# Patient Record
Sex: FEMALE | HISPANIC OR LATINO | Employment: FULL TIME | URBAN - METROPOLITAN AREA
[De-identification: names, ages, dates, MRNs, and addresses within clinical notes are randomized per-mention and may not be internally consistent; named-entity substitution may affect disease eponyms.]

---

## 2017-03-01 ENCOUNTER — OFFICE VISIT (OUTPATIENT)
Dept: URGENT CARE | Facility: PHYSICIAN GROUP | Age: 57
End: 2017-03-01
Payer: COMMERCIAL

## 2017-03-01 VITALS
HEART RATE: 72 BPM | BODY MASS INDEX: 26.06 KG/M2 | TEMPERATURE: 97.3 F | SYSTOLIC BLOOD PRESSURE: 118 MMHG | HEIGHT: 61 IN | WEIGHT: 138 LBS | DIASTOLIC BLOOD PRESSURE: 90 MMHG | RESPIRATION RATE: 14 BRPM | OXYGEN SATURATION: 97 %

## 2017-03-01 DIAGNOSIS — L01.00 IMPETIGO: ICD-10-CM

## 2017-03-01 PROCEDURE — 99214 OFFICE O/P EST MOD 30 MIN: CPT | Performed by: NURSE PRACTITIONER

## 2017-03-01 ASSESSMENT — ENCOUNTER SYMPTOMS
SHORTNESS OF BREATH: 0
NAUSEA: 0
CHILLS: 0
FEVER: 0
HEADACHES: 0
VOMITING: 0

## 2017-03-01 NOTE — PATIENT INSTRUCTIONS
Impetigo, Adult  Impetigo is an infection of the skin. It commonly occurs in young children, but it can also occur in adults. The infection causes itchy blisters and sores that produce brownish-yellow fluid. As the fluid dries, it forms a thick, honey-colored crust. These skin changes usually occur on the face but can also affect other areas of the body. Impetigo usually goes away in 7-10 days with treatment.  CAUSES  Impetigo is caused by two types of bacteria. It may be caused by staphylococci or streptococci bacteria. These bacteria cause impetigo when they get under the surface of the skin. This often happens after some damage to the skin, such as damage from:  · Cuts, scrapes, or scratches.  · Insect bites, especially when you scratch the area of a bite.  · Chickenpox or other illnesses that cause open skin sores.  · Nail biting or chewing.  Impetigo is contagious and can spread easily from one person to another. This may occur through close skin contact or by sharing towels, clothing, or other items with a person who has the infection.  RISK FACTORS  Some things that can increase the risk of getting this infection include:  · Playing sports that include skin-to-skin contact with others.  · Having a skin condition with open sores.  · Having many skin cuts or scrapes.  · Living in an area that has high humidity levels.  · Having poor hygiene.  · Having high levels of staphylococci in your nose.  SIGNS AND SYMPTOMS  Impetigo usually starts out as small blisters, often on the face. The blisters then break open and turn into tiny sores (lesions) with a yellow crust. In some cases, the blisters cause itching or burning. With scratching, irritation, or lack of treatment, these small lesions may get larger. Scratching can also cause impetigo to spread to other parts of the body. The bacteria can get under the fingernails and spread when you touch another area of your skin.  Other possible symptoms include:  · Larger  blisters.  · Pus.  · Swollen lymph glands.  DIAGNOSIS  This condition is usually diagnosed during a physical exam. A skin sample or sample of fluid from a blister may be taken for lab tests that involve growing bacteria (culture test). This can help confirm the diagnosis or help determine the best treatment.  TREATMENT  Mild impetigo can be treated with prescription antibiotic cream. Oral antibiotic medicine may be used in more severe cases. Medicines for itching may also be used.  HOME CARE INSTRUCTIONS  · Take medicines only as directed by your health care provider.  · To help prevent impetigo from spreading to other body areas:  ¨ Keep your fingernails short and clean.  ¨ Do not scratch the blisters or sores.  ¨ Cover infected areas, if necessary, to keep from scratching.  · Gently wash the infected areas with antibiotic soap and water.  · Soak crusted areas in warm, soapy water using antibiotic soap.  ¨ Gently rub the areas to remove crusts. Do not scrub.  · Wash your hands often to avoid spreading this infection.  · Stay home until you have used an antibiotic cream for 48 hours (2 days) or an oral antibiotic medicine for 24 hours (1 day). You should only return to work and activities with other people if your skin shows significant improvement.  PREVENTION   To keep the infection from spreading:  · Stay home until you have used an antibiotic cream for 48 hours or an oral antibiotic for 24 hours.  · Wash your hands often.  · Do not engage in skin-to-skin contact with other people while you have still have blisters.  · Do not share towels, washcloths, or bedding with others while you have the infection.  SEEK MEDICAL CARE IF:  · You develop more blisters or sores despite treatment.  · Other family members get sores.  · Your skin sores are not improving after 48 hours of treatment.  · You have a fever.  SEEK IMMEDIATE MEDICAL CARE IF:  · You see spreading redness or swelling of the skin around your sores.  · You  see red streaks coming from your sores.  · You develop a sore throat.     This information is not intended to replace advice given to you by your health care provider. Make sure you discuss any questions you have with your health care provider.     Document Released: 01/08/2016 Document Reviewed: 01/08/2016  [employer] Interactive Patient Education ©2016 [employer]

## 2017-03-01 NOTE — PROGRESS NOTES
"Subjective:      Cherrie Gordon is a 56 y.o. female who presents with Blister            HPI Comments: Medications, Allergies and Prior Medical Hx reviewed and updated in [facility].with patient/family today         Blister  This is a new problem. The current episode started yesterday. The problem occurs constantly. The problem has been unchanged. Associated symptoms include a rash. Pertinent negatives include no chills, fever, headaches, nausea or vomiting. Nothing aggravates the symptoms. Treatments tried: neosporine. The treatment provided mild relief.       Review of Systems   Constitutional: Negative for fever and chills.   Respiratory: Negative for shortness of breath.    Gastrointestinal: Negative for nausea and vomiting.   Skin: Positive for itching and rash.   Neurological: Negative for headaches.          Objective:     /90 mmHg  Pulse 72  Temp(Src) 36.3 °C (97.3 °F)  Resp 14  Ht 1.549 m (5' 0.98\")  Wt 62.596 kg (138 lb)  BMI 26.09 kg/m2  SpO2 97%     Physical Exam   Constitutional: She appears well-developed and well-nourished. No distress.   HENT:   Head: Normocephalic and atraumatic.   Eyes: Conjunctivae are normal. Pupils are equal, round, and reactive to light.   Neck: Neck supple.   Cardiovascular: Normal rate, regular rhythm and normal heart sounds.    Pulmonary/Chest: Effort normal and breath sounds normal. No respiratory distress.   Neurological: She is alert.   Awake, alert, answering questions appropriately, moving all extremeties   Skin: Skin is warm and dry. No rash noted.        Psychiatric: She has a normal mood and affect. Her behavior is normal.   Vitals reviewed.              Assessment/Plan:       1. Impetigo  mupirocin (BACTROBAN) 2 % Ointment           Pt will go to the ER for worsening or changing symptoms as discussed, worsening or changing rash, fevers, swelling mouth or throat, difficulty breathing  Follow-up with your primary care provider or return here if " not improving in 1 week  Discharge instructions discussed with pt/family who verbalize understanding and agreement with poc

## 2017-03-01 NOTE — MR AVS SNAPSHOT
"        Cherrie Jonesnarciso   3/1/2017 8:30 AM   Office Visit   MRN: [id number]    Department:  [facility]   Dept Phone:  [phone]    Description:  Female : 1960   Provider:  BART Soriano           Reason for Visit     Blister to lower lip, onset last night, with pain under her chin and around her nose      Allergies as of 3/1/2017     Allergen Noted Reactions    Pcn [Penicillins] 2010         You were diagnosed with     Impetigo   [684.ICD-9-CM]         Vital Signs     Blood Pressure Pulse Temperature Respirations Height Weight    118/90 mmHg 72 36.3 °C (97.3 °F) 14 1.549 m (5' 0.98\") 62.596 kg (138 lb)    Body Mass Index Oxygen Saturation Smoking Status             26.09 kg/m2 97% Former Smoker         Basic Information     Date Of Birth Sex Race Ethnicity Preferred Language    1960 Female  or  Non- English      Problem List              ICD-10-CM Priority Class Noted - Resolved    Insomnia G47.00   2016 - Present    Osteoporosis M81.0   2016 - Present    Gastroesophageal reflux disease without esophagitis K21.9   2016 - Present      Health Maintenance        Date Due Completion Dates    IMM DTaP/Tdap/Td Vaccine (1 - Tdap) 3/28/1981 ---    PAP SMEAR 3/28/1983 ---    COLONOSCOPY 3/28/2012 ---    IMM INFLUENZA (1) 2016    MAMMOGRAM 5/10/2017 5/10/2016, 12/3/2010, 2004            Current Immunizations     Influenza Vaccine Quad Inj (Pf) 2016      Below and/or attached are the medications your provider expects you to take. Review all of your home medications and newly ordered medications with your provider and/or pharmacist. Follow medication instructions as directed by your provider and/or pharmacist. Please keep your medication list with you and share with your provider. Update the information when medications are discontinued, doses are changed, or new medications (including over-the-counter products) " are added; and carry medication information at all times in the event of emergency situations     Allergies:  PCN - (reactions not documented)               Medications  Valid as of: March 01, 2017 -  9:15 AM    Generic Name Brand Name Tablet Size Instructions for use    Azithromycin (Tab) ZITHROMAX 250 MG Take as directed on package. Dispense one package.        Azithromycin (Tab) ZITHROMAX 250 MG With food as directed        DiphenhydrAMINE HCl   Take  by mouth.        Fluticasone Propionate (Suspension) FLONASE 50 MCG/ACT Spray 2 Sprays in nose every day.        Ibuprofen (Tab) MOTRIN 200 MG Take 200 mg by mouth every 6 hours as needed.        Mupirocin (Ointment) BACTROBAN 2 % Apply a thin layer to wound or rash 3 times a day        Promethazine-Codeine (Syrup) PHENERGAN-CODEINE 6.25-10 MG/5ML Take 3-4 mL by mouth every 12 hours as needed.        RaNITidine HCl (Tab) ZANTAC 150 MG Take 1 Tab by mouth 2 times a day.        .                 Medicines prescribed today were sent to:     [facility] [address] [place] [address] [place], [place] - [address] [facility]    [address] [place] [address]    Phone: [phone] Fax: [phone]    Open 24 Hours?: No      Medication refill instructions:       If your prescription bottle indicates you have medication refills left, it is not necessary to call your provider’s office. Please contact your pharmacy and they will refill your medication.    If your prescription bottle indicates you do not have any refills left, you may request refills at any time through one of the following ways: The online [employer] system (except Urgent Care), by calling your provider’s office, or by asking your pharmacy to contact your provider’s office with a refill request. Medication refills are processed only during regular business hours and may not be available until the next business day. Your provider may request additional information or to have a follow-up visit with you  prior to refilling your medication.   *Please Note: Medication refills are assigned a new Rx number when refilled electronically. Your pharmacy may indicate that no refills were authorized even though a new prescription for the same medication is available at the pharmacy. Please request the medicine by name with the pharmacy before contacting your provider for a refill.        Instructions    Impetigo, Adult  Impetigo is an infection of the skin. It commonly occurs in young children, but it can also occur in adults. The infection causes itchy blisters and sores that produce brownish-yellow fluid. As the fluid dries, it forms a thick, honey-colored crust. These skin changes usually occur on the face but can also affect other areas of the body. Impetigo usually goes away in 7-10 days with treatment.  CAUSES  Impetigo is caused by two types of bacteria. It may be caused by staphylococci or streptococci bacteria. These bacteria cause impetigo when they get under the surface of the skin. This often happens after some damage to the skin, such as damage from:  · Cuts, scrapes, or scratches.  · Insect bites, especially when you scratch the area of a bite.  · Chickenpox or other illnesses that cause open skin sores.  · Nail biting or chewing.  Impetigo is contagious and can spread easily from one person to another. This may occur through close skin contact or by sharing towels, clothing, or other items with a person who has the infection.  RISK FACTORS  Some things that can increase the risk of getting this infection include:  · Playing sports that include skin-to-skin contact with others.  · Having a skin condition with open sores.  · Having many skin cuts or scrapes.  · Living in an area that has high humidity levels.  · Having poor hygiene.  · Having high levels of staphylococci in your nose.  SIGNS AND SYMPTOMS  Impetigo usually starts out as small blisters, often on the face. The blisters then break open and turn into  tiny sores (lesions) with a yellow crust. In some cases, the blisters cause itching or burning. With scratching, irritation, or lack of treatment, these small lesions may get larger. Scratching can also cause impetigo to spread to other parts of the body. The bacteria can get under the fingernails and spread when you touch another area of your skin.  Other possible symptoms include:  · Larger blisters.  · Pus.  · Swollen lymph glands.  DIAGNOSIS  This condition is usually diagnosed during a physical exam. A skin sample or sample of fluid from a blister may be taken for lab tests that involve growing bacteria (culture test). This can help confirm the diagnosis or help determine the best treatment.  TREATMENT  Mild impetigo can be treated with prescription antibiotic cream. Oral antibiotic medicine may be used in more severe cases. Medicines for itching may also be used.  HOME CARE INSTRUCTIONS  · Take medicines only as directed by your health care provider.  · To help prevent impetigo from spreading to other body areas:  ¨ Keep your fingernails short and clean.  ¨ Do not scratch the blisters or sores.  ¨ Cover infected areas, if necessary, to keep from scratching.  · Gently wash the infected areas with antibiotic soap and water.  · Soak crusted areas in warm, soapy water using antibiotic soap.  ¨ Gently rub the areas to remove crusts. Do not scrub.  · Wash your hands often to avoid spreading this infection.  · Stay home until you have used an antibiotic cream for 48 hours (2 days) or an oral antibiotic medicine for 24 hours (1 day). You should only return to work and activities with other people if your skin shows significant improvement.  PREVENTION   To keep the infection from spreading:  · Stay home until you have used an antibiotic cream for 48 hours or an oral antibiotic for 24 hours.  · Wash your hands often.  · Do not engage in skin-to-skin contact with other people while you have still have blisters.  · Do  not share towels, washcloths, or bedding with others while you have the infection.  SEEK MEDICAL CARE IF:  · You develop more blisters or sores despite treatment.  · Other family members get sores.  · Your skin sores are not improving after 48 hours of treatment.  · You have a fever.  SEEK IMMEDIATE MEDICAL CARE IF:  · You see spreading redness or swelling of the skin around your sores.  · You see red streaks coming from your sores.  · You develop a sore throat.     This information is not intended to replace advice given to you by your health care provider. Make sure you discuss any questions you have with your health care provider.     Document Released: 01/08/2016 Document Reviewed: 01/08/2016  [employer] Interactive Patient Education ©2016 [employer]            [employer] Access Code: Activation code not generated  Current [employer] Status: Active

## 2017-06-13 ENCOUNTER — HOSPITAL ENCOUNTER (OUTPATIENT)
Dept: RADIOLOGY | Facility: MEDICAL CENTER | Age: 57
End: 2017-06-13
Attending: NURSE PRACTITIONER
Payer: COMMERCIAL

## 2017-06-13 DIAGNOSIS — Z12.31 VISIT FOR SCREENING MAMMOGRAM: ICD-10-CM

## 2017-06-13 DIAGNOSIS — Z30.09 SCREENING AND EVALUATION FOR FEMALE STERILIZATION: ICD-10-CM

## 2017-06-13 PROCEDURE — G0202 SCR MAMMO BI INCL CAD: HCPCS

## 2017-09-12 ENCOUNTER — OFFICE VISIT (OUTPATIENT)
Dept: MEDICAL GROUP | Facility: MEDICAL CENTER | Age: 57
End: 2017-09-12
Payer: COMMERCIAL

## 2017-09-12 ENCOUNTER — HOSPITAL ENCOUNTER (OUTPATIENT)
Dept: RADIOLOGY | Facility: MEDICAL CENTER | Age: 57
End: 2017-09-12
Attending: NURSE PRACTITIONER
Payer: COMMERCIAL

## 2017-09-12 VITALS
BODY MASS INDEX: 27.09 KG/M2 | DIASTOLIC BLOOD PRESSURE: 60 MMHG | SYSTOLIC BLOOD PRESSURE: 102 MMHG | HEART RATE: 81 BPM | HEIGHT: 60 IN | OXYGEN SATURATION: 92 % | RESPIRATION RATE: 16 BRPM | TEMPERATURE: 97.3 F | WEIGHT: 138 LBS

## 2017-09-12 DIAGNOSIS — Z12.11 SCREEN FOR COLON CANCER: ICD-10-CM

## 2017-09-12 DIAGNOSIS — M25.542 ARTHRALGIA OF BOTH HANDS: ICD-10-CM

## 2017-09-12 DIAGNOSIS — Z23 INFLUENZA VACCINE NEEDED: ICD-10-CM

## 2017-09-12 DIAGNOSIS — I80.8 PHLEBITIS AND THROMBOPHLEBITIS OF DEEP VEINS OF THE UPPER EXTREMITIES: ICD-10-CM

## 2017-09-12 DIAGNOSIS — M25.541 ARTHRALGIA OF BOTH HANDS: ICD-10-CM

## 2017-09-12 DIAGNOSIS — M81.0 AGE-RELATED OSTEOPOROSIS WITHOUT CURRENT PATHOLOGICAL FRACTURE: ICD-10-CM

## 2017-09-12 DIAGNOSIS — Z00.00 ROUTINE GENERAL MEDICAL EXAMINATION AT A HEALTH CARE FACILITY: ICD-10-CM

## 2017-09-12 PROCEDURE — 77077 JOINT SURVEY SINGLE VIEW: CPT

## 2017-09-12 PROCEDURE — 90686 IIV4 VACC NO PRSV 0.5 ML IM: CPT | Performed by: NURSE PRACTITIONER

## 2017-09-12 PROCEDURE — 99214 OFFICE O/P EST MOD 30 MIN: CPT | Mod: 25 | Performed by: NURSE PRACTITIONER

## 2017-09-12 PROCEDURE — 90471 IMMUNIZATION ADMIN: CPT | Performed by: NURSE PRACTITIONER

## 2017-09-12 RX ORDER — ALENDRONATE SODIUM 35 MG/1
35 TABLET ORAL
Qty: 4 TAB | Refills: 11 | Status: SHIPPED | OUTPATIENT
Start: 2017-09-12 | End: 2018-10-15 | Stop reason: SDUPTHER

## 2017-09-12 ASSESSMENT — PATIENT HEALTH QUESTIONNAIRE - PHQ9: CLINICAL INTERPRETATION OF PHQ2 SCORE: 0

## 2017-09-12 NOTE — PROGRESS NOTES
Subjective:      Cherrie Gordon is a 57 y.o. female who presents with Pain (joint pain in feet and hands x 3 months comes and goes)            HPI Cherrie Samuels is a pleasant [place]raini-speaking female here today accompanied by her daughter to discuss lab results as well as problems with joint pain and phlebitis. I have not seen patient in 1-1/2 years.      1. Age-related osteoporosis without current pathological fracture  Patient had a bone density scan done earlier this year which showed osteoporosis. She reports that at one time she was on Fosamax for a few years but then came off the medicine and has not been on it for at least 2 years. She has not had a recent fracture.    2. Arthralgia of both hands  Patient states she gets recurring pain in the thumbs of her hands bilaterally. It also sometimes affects her large toes bilaterally. It comes on most often when she is active such as with doing housework and walking. 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 She has not noticed redness or swelling of the joints.    3. Phlebitis and thrombophlebitis of deep veins of the upper extremities  Patient has a raised, purplish appearing area on her right forearm. She states this has been present for years but with her current job, it often rubs up against things causing discomfort. She would like to have her removed if possible.      Current Outpatient Prescriptions   Medication Sig Dispense Refill   • alendronate (FOSAMAX) 35 MG tablet Take 1 Tab by mouth every 7 days. 4 Tab 11   • DiphenhydrAMINE HCl (BENADRYL ALLERGY PO) Take  by mouth.     • mupirocin (BACTROBAN) 2 % Ointment Apply a thin layer to wound or rash 3 times a day (Patient not taking: Reported on 9/12/2017) 1 Tube 0   • azithromycin (ZITHROMAX) 250 MG Tab With food as directed (Patient not taking: Reported on 9/12/2017) 6 Tab 0   • ibuprofen (MOTRIN) 200 MG Tab Take 200 mg by mouth every 6  hours as needed.     • azithromycin (ZITHROMAX) 250 MG Tab Take as directed on package. Dispense one package. (Patient not taking: Reported on 9/12/2017) 6 Tab 0   • promethazine-codeine (PHENERGAN-CODEINE) 6.25-10 MG/5ML Syrup Take 3-4 mL by mouth every 12 hours as needed. 150 mL 0   • fluticasone (FLONASE) 50 MCG/ACT nasal spray Spray 2 Sprays in nose every day. 16 g 0   • ranitidine (ZANTAC) 150 MG Tab Take 1 Tab by mouth 2 times a day. 60 Tab 11     No current facility-administered medications for this visit.      Social History   Substance Use Topics   • Smoking status: Former Smoker     Packs/day: 0.25     Years: 2.00     Types: Cigarettes   • Smokeless tobacco: Never Used      Comment: started smoking at age 32 1 pack per month   • Alcohol use No     Past Medical History:   Diagnosis Date   • OSTEOPOROSIS    • Ulcer (CMS-HCC)      Family History   Problem Relation Age of Onset   • Heart Disease Mother    • Dementia Father        Review of Systems   Musculoskeletal: Positive for joint pain.   All other systems reviewed and are negative.         Objective:     /60   Pulse 81   Temp 36.3 °C (97.3 °F)   Resp 16   Ht 1.524 m (5')   Wt 62.6 kg (138 lb)   SpO2 92%   BMI 26.95 kg/m²      Physical Exam   Constitutional: She is oriented to person, place, and time. She appears well-developed and well-nourished. No distress.   HENT:   Head: Normocephalic and atraumatic.   Right Ear: External ear normal.   Left Ear: External ear normal.   Nose: Nose normal.   Eyes: Right eye exhibits no discharge. Left eye exhibits no discharge.   Neck: Normal range of motion. Neck supple. No thyromegaly present.   Cardiovascular: Normal rate, regular rhythm and normal heart sounds.  Exam reveals no gallop and no friction rub.    No murmur heard.  Pulmonary/Chest: Effort normal and breath sounds normal. She has no wheezes. She has no rales.   Musculoskeletal: She exhibits no edema or tenderness.   Inspection of the toes and  hands bilaterally reveal no edema or deformity. There is no erythema. She has good range of motion.   Neurological: She is alert and oriented to person, place, and time. She displays normal reflexes.   Skin: Skin is warm and dry. No rash noted. She is not diaphoretic.   There are 3 raised purple colored areas on the right forearm.   Psychiatric: She has a normal mood and affect. Her behavior is normal. Judgment and thought content normal.   Nursing note and vitals reviewed.              Assessment/Plan:     1. Age-related osteoporosis without current pathological fracture  Patient's bone density scan shows osteoporosis. I explained the risk for fractures and she will start back on Fosamax weekly. I told her we should do a repeat bone density scan in 1-2 years. She also needs to be sure she is using her calcium and vitamin D.  - alendronate (FOSAMAX) 35 MG tablet; Take 1 Tab by mouth every 7 days.  Dispense: 4 Tab; Refill: 11    2. Arthralgia of both hands  Possibly osteoarthritis but because it is multiple joints and will do a rheumatoid workup.  - DX-JOINT SURVEY-HANDS SINGLE VIEW; Future  - RHEUMATOID ARTHRITIS FACTOR; Future  - CCP ANTIBODY; Future  - URIC ACID; Future  - WESTERGREN SED RATE; Future  - SANAZ ANTIBODY WITH REFLEX    3. Phlebitis and thrombophlebitis of deep veins of the upper extremities  I will refer patient to a vascular specialist to see if there is anything that can be done for this raised vascular area on her right arm which she has had for a while but is causing discomfort.  - REFERRAL TO VASCULAR SURGERY    4. Screen for colon cancer    - REFERRAL TO GASTROENTEROLOGY    5. Influenza vaccine needed  I have placed the below orders and discussed them with an approved delegating provider. The MA is performing the below orders under the direction of Dr. Kay    - Flu Quad Inj >3 Year Pre-Filled PF    6. Routine general medical examination at a health care facility    - COMP METABOLIC PANEL;  Future  - LIPID PROFILE; Future

## 2017-09-13 ENCOUNTER — HOSPITAL ENCOUNTER (OUTPATIENT)
Dept: LAB | Facility: MEDICAL CENTER | Age: 57
End: 2017-09-13
Attending: NURSE PRACTITIONER
Payer: COMMERCIAL

## 2017-09-13 DIAGNOSIS — M25.542 ARTHRALGIA OF BOTH HANDS: ICD-10-CM

## 2017-09-13 DIAGNOSIS — M25.541 ARTHRALGIA OF BOTH HANDS: ICD-10-CM

## 2017-09-13 DIAGNOSIS — Z00.00 ROUTINE GENERAL MEDICAL EXAMINATION AT A HEALTH CARE FACILITY: ICD-10-CM

## 2017-09-13 LAB
ALBUMIN SERPL BCP-MCNC: 4.3 G/DL (ref 3.2–4.9)
ALBUMIN/GLOB SERPL: 1.3 G/DL
ALP SERPL-CCNC: 79 U/L (ref 30–99)
ALT SERPL-CCNC: 22 U/L (ref 2–50)
ANION GAP SERPL CALC-SCNC: 5 MMOL/L (ref 0–11.9)
AST SERPL-CCNC: 21 U/L (ref 12–45)
BILIRUB SERPL-MCNC: 0.4 MG/DL (ref 0.1–1.5)
BUN SERPL-MCNC: 18 MG/DL (ref 8–22)
CALCIUM SERPL-MCNC: 9.7 MG/DL (ref 8.5–10.5)
CHLORIDE SERPL-SCNC: 108 MMOL/L (ref 96–112)
CHOLEST SERPL-MCNC: 190 MG/DL (ref 100–199)
CO2 SERPL-SCNC: 25 MMOL/L (ref 20–33)
CREAT SERPL-MCNC: 0.71 MG/DL (ref 0.5–1.4)
ERYTHROCYTE [SEDIMENTATION RATE] IN BLOOD BY WESTERGREN METHOD: 10 MM/HOUR (ref 0–30)
GFR SERPL CREATININE-BSD FRML MDRD: >60 ML/MIN/1.73 M 2
GLOBULIN SER CALC-MCNC: 3.2 G/DL (ref 1.9–3.5)
GLUCOSE SERPL-MCNC: 92 MG/DL (ref 65–99)
HDLC SERPL-MCNC: 60 MG/DL
LDLC SERPL CALC-MCNC: 118 MG/DL
POTASSIUM SERPL-SCNC: 4.2 MMOL/L (ref 3.6–5.5)
PROT SERPL-MCNC: 7.5 G/DL (ref 6–8.2)
RHEUMATOID FACT SER IA-ACNC: <10 IU/ML (ref 0–14)
SODIUM SERPL-SCNC: 138 MMOL/L (ref 135–145)
TRIGL SERPL-MCNC: 62 MG/DL (ref 0–149)
URATE SERPL-MCNC: 4.2 MG/DL (ref 1.9–8.2)

## 2017-09-13 PROCEDURE — 86431 RHEUMATOID FACTOR QUANT: CPT

## 2017-09-13 PROCEDURE — 86200 CCP ANTIBODY: CPT

## 2017-09-13 PROCEDURE — 85652 RBC SED RATE AUTOMATED: CPT

## 2017-09-13 PROCEDURE — 36415 COLL VENOUS BLD VENIPUNCTURE: CPT

## 2017-09-13 PROCEDURE — 80061 LIPID PANEL: CPT

## 2017-09-13 PROCEDURE — 80053 COMPREHEN METABOLIC PANEL: CPT

## 2017-09-13 PROCEDURE — 84550 ASSAY OF BLOOD/URIC ACID: CPT

## 2017-09-14 LAB — CCP IGG SERPL-ACNC: 4 UNITS (ref 0–19)

## 2018-01-05 ENCOUNTER — OFFICE VISIT (OUTPATIENT)
Dept: MEDICAL GROUP | Facility: MEDICAL CENTER | Age: 58
End: 2018-01-05
Payer: COMMERCIAL

## 2018-01-05 VITALS
HEIGHT: 60 IN | BODY MASS INDEX: 27.48 KG/M2 | DIASTOLIC BLOOD PRESSURE: 70 MMHG | HEART RATE: 64 BPM | OXYGEN SATURATION: 97 % | RESPIRATION RATE: 16 BRPM | SYSTOLIC BLOOD PRESSURE: 122 MMHG | WEIGHT: 140 LBS

## 2018-01-05 DIAGNOSIS — G24.5 EYE TWITCH: ICD-10-CM

## 2018-01-05 DIAGNOSIS — K63.5 POLYP OF COLON, UNSPECIFIED PART OF COLON, UNSPECIFIED TYPE: ICD-10-CM

## 2018-01-05 DIAGNOSIS — K30 INDIGESTION: ICD-10-CM

## 2018-01-05 DIAGNOSIS — R51.9 NONINTRACTABLE EPISODIC HEADACHE, UNSPECIFIED HEADACHE TYPE: ICD-10-CM

## 2018-01-05 DIAGNOSIS — M81.0 AGE-RELATED OSTEOPOROSIS WITHOUT CURRENT PATHOLOGICAL FRACTURE: ICD-10-CM

## 2018-01-05 DIAGNOSIS — F51.01 PRIMARY INSOMNIA: ICD-10-CM

## 2018-01-05 DIAGNOSIS — K21.9 GASTROESOPHAGEAL REFLUX DISEASE WITHOUT ESOPHAGITIS: ICD-10-CM

## 2018-01-05 DIAGNOSIS — Z01.419 VISIT FOR GYNECOLOGIC EXAMINATION: ICD-10-CM

## 2018-01-05 PROCEDURE — 99213 OFFICE O/P EST LOW 20 MIN: CPT | Performed by: NURSE PRACTITIONER

## 2018-01-05 ASSESSMENT — ENCOUNTER SYMPTOMS
HEADACHES: 1
HEARTBURN: 1
INSOMNIA: 1

## 2018-01-05 NOTE — PROGRESS NOTES
Subjective:      Cherrie Gordon is a 57 y.o. female who presents with Annual Exam    CC: Is here today accompanied by her daughter for a number of concerns including indigestion eye twitch and headache.        HPI Cherrie Aguirres here today for the following.      1. Age-related osteoporosis without current pathological fracture  Patient was started on bisphosphonate within the year because of osteoporosis and reports no side effects. She is due for her two-year follow-up bone density scan in May.    2. Gastroesophageal reflux disease without esophagitis  Patient continues to use Zantac as needed for acid reflux with no worsening of symptoms.    3. Eye twitch  Patient is concerned about a twitching of the right eye which has been present for about 2 months. It occurs frequently throughout the day. She does not know what triggers it. She states sometimes she has discomfort on the right side of her head with this. She denies vision loss, tearing, sinus pain or pressure. She does wear glasses but has not been to an ophthalmologist recently.    4. Nonintractable episodic headache, unspecified headache type  Patient reports possible headache associated with the eye twitching but she has had no confusion, oral, nausea or unilateral weakness.    5. Primary insomnia  Patient continues to have some issues with insomnia but uses over-the-counter medicine for.    6. Indigestion  Patient reports her indigestion has been more frequent recently but she has had no black stools or vomiting and uses Zantac periodically.    7. Polyp of colon, unspecified part of colon, unspecified type  Patient was seen in the last year for her colonoscopy and was found to have a colon polyp and five-year follow-up was recommended.    8. Visit for gynecologic examination  Patient has not had a Pap smear in a few years and would like to see a female medical provider.  Social History   Substance Use Topics   • Smoking  status: Former Smoker     Packs/day: 0.25     Years: 2.00     Types: Cigarettes   • Smokeless tobacco: Never Used      Comment: started smoking at age 32 1 pack per month   • Alcohol use No     Current Outpatient Prescriptions   Medication Sig Dispense Refill   • alendronate (FOSAMAX) 35 MG tablet Take 1 Tab by mouth every 7 days. 4 Tab 11   • fluticasone (FLONASE) 50 MCG/ACT nasal spray Spray 2 Sprays in nose every day. 16 g 0   • ranitidine (ZANTAC) 150 MG Tab Take 1 Tab by mouth 2 times a day. 60 Tab 11     No current facility-administered medications for this visit.      Family History   Problem Relation Age of Onset   • Heart Disease Mother    • Dementia Father      Past Medical History:   Diagnosis Date   • OSTEOPOROSIS    • Ulcer (CMS-[facility])        Review of Systems   Gastrointestinal: Positive for heartburn.   Neurological: Positive for headaches.   Psychiatric/Behavioral: The patient has insomnia.    All other systems reviewed and are negative.         Objective:     /70   Pulse 64   Resp 16   Ht 1.524 m (5')   Wt 63.5 kg (140 lb)   SpO2 97%   BMI 27.34 kg/m²      Physical Exam   Constitutional: She is oriented to person, place, and time. She appears well-developed and well-nourished. No distress.   HENT:   Head: Normocephalic and atraumatic.   Right Ear: External ear normal.   Left Ear: External ear normal.   Nose: Nose normal.   Eyes: Right eye exhibits no discharge. Left eye exhibits no discharge.   Neck: Normal range of motion. Neck supple. No thyromegaly present.   Cardiovascular: Normal rate, regular rhythm and normal heart sounds.  Exam reveals no gallop and no friction rub.    No murmur heard.  Pulmonary/Chest: Effort normal and breath sounds normal. She has no wheezes. She has no rales.   Musculoskeletal: She exhibits no edema or tenderness.   Neurological: She is alert and oriented to person, place, and time. She displays normal reflexes.   Once during the exam today twitching of the  right upper eyelid was noted for a few seconds. No tenderness to palpation over the head. She has normal neurological activity.   Skin: Skin is warm and dry. No rash noted. She is not diaphoretic.   Psychiatric: She has a normal mood and affect. Her behavior is normal. Judgment and thought content normal.   Nursing note and vitals reviewed.              Assessment/Plan:     1. Age-related osteoporosis without current pathological fracture  Patient due for two-year follow-up bone density scan.  - DS-BONE DENSITY STUDY (DEXA); Future    2. Gastroesophageal reflux disease without esophagitis  Patient will continue with Zantac as needed.    3. Eye twitch  I advised patient this is most likely musculoskeletal and spasm which is not severe but she is concerned because of this with her headache and it not improving so a CT of the head was ordered. She has no tenderness over the right side of her head and her vision has not changed. I told her if it does not improve I would refer her to neurology.  - CT-HEAD W/O; Future    4. Nonintractable episodic headache, unspecified headache type  I will try to get imaging because of new onset headache and eye twitching.  - CT-HEAD W/O; Future    5. Primary insomnia  Patient will continue with her regular medicines.    6. Indigestion  Patient advised to use her Zantac.    7. Polyp of colon, unspecified part of colon, unspecified type  I reviewed with patient results from gastroenterology and need for follow-up colonoscopy in a few years.    8. Visit for gynecologic examination  Patient due for 3 year mammogram.  - REFERRAL TO GYNECOLOGY

## 2018-01-12 ENCOUNTER — HOSPITAL ENCOUNTER (OUTPATIENT)
Dept: RADIOLOGY | Facility: MEDICAL CENTER | Age: 58
End: 2018-01-12
Attending: NURSE PRACTITIONER
Payer: COMMERCIAL

## 2018-01-12 DIAGNOSIS — G24.5 EYE TWITCH: ICD-10-CM

## 2018-01-12 DIAGNOSIS — R51.9 NONINTRACTABLE EPISODIC HEADACHE, UNSPECIFIED HEADACHE TYPE: ICD-10-CM

## 2018-01-12 PROCEDURE — 70450 CT HEAD/BRAIN W/O DYE: CPT

## 2018-01-26 ENCOUNTER — HOSPITAL ENCOUNTER (OUTPATIENT)
Dept: RADIOLOGY | Facility: MEDICAL CENTER | Age: 58
End: 2018-01-26
Attending: NURSE PRACTITIONER
Payer: COMMERCIAL

## 2018-01-26 ENCOUNTER — OFFICE VISIT (OUTPATIENT)
Dept: MEDICAL GROUP | Facility: MEDICAL CENTER | Age: 58
End: 2018-01-26
Payer: COMMERCIAL

## 2018-01-26 VITALS
BODY MASS INDEX: 27.48 KG/M2 | WEIGHT: 140 LBS | DIASTOLIC BLOOD PRESSURE: 74 MMHG | TEMPERATURE: 97.1 F | SYSTOLIC BLOOD PRESSURE: 128 MMHG | HEIGHT: 60 IN | OXYGEN SATURATION: 95 % | RESPIRATION RATE: 16 BRPM | HEART RATE: 82 BPM

## 2018-01-26 DIAGNOSIS — M50.90 CERVICAL BACK PAIN WITH EVIDENCE OF DISC DISEASE: ICD-10-CM

## 2018-01-26 DIAGNOSIS — R07.89 CHEST WALL PAIN: ICD-10-CM

## 2018-01-26 DIAGNOSIS — M25.512 ACUTE PAIN OF LEFT SHOULDER: ICD-10-CM

## 2018-01-26 DIAGNOSIS — M81.0 AGE-RELATED OSTEOPOROSIS WITHOUT CURRENT PATHOLOGICAL FRACTURE: ICD-10-CM

## 2018-01-26 PROCEDURE — 72040 X-RAY EXAM NECK SPINE 2-3 VW: CPT

## 2018-01-26 PROCEDURE — 73030 X-RAY EXAM OF SHOULDER: CPT | Mod: LT

## 2018-01-26 PROCEDURE — 99214 OFFICE O/P EST MOD 30 MIN: CPT | Performed by: NURSE PRACTITIONER

## 2018-01-26 PROCEDURE — 71046 X-RAY EXAM CHEST 2 VIEWS: CPT

## 2018-01-26 RX ORDER — TIZANIDINE 4 MG/1
4 TABLET ORAL EVERY 6 HOURS PRN
Qty: 30 TAB | Refills: 3 | Status: SHIPPED | OUTPATIENT
Start: 2018-01-26 | End: 2018-11-05

## 2018-01-26 RX ORDER — ACETAMINOPHEN 500 MG
500-1000 TABLET ORAL EVERY 6 HOURS PRN
Qty: 30 TAB | Refills: 0 | COMMUNITY
Start: 2018-01-26 | End: 2018-11-30

## 2018-01-26 ASSESSMENT — ENCOUNTER SYMPTOMS: BACK PAIN: 1

## 2018-01-27 NOTE — PROGRESS NOTES
Subjective:      Cherrie Gordon is a 57 y.o. female who presents with Back Pain (upper back left side)        CC: Patient is here today accompanied by her daughter for complaints of muscular skeletal pain.    HPI Cherrie Gordon reports that her symptoms started about a week ago mostly in her left upper back. Sometimes she would also have pain in her left shoulder and her chest wall. She states the pain was reproducible by touching on the areas or with Rodolfo in turning over in bed. She has had problems with low back pain in the past. She also has had rheumatoid workup which was negative. She states her symptoms actually had almost gone away up until about 2 days ago and they are better than they were 4 and 5 days ago. She denies paresthesias, shortness of breath, cough, dizziness or chest pain.      Social History   Substance Use Topics   • Smoking status: Former Smoker     Packs/day: 0.25     Years: 2.00     Types: Cigarettes   • Smokeless tobacco: Never Used      Comment: started smoking at age 32 1 pack per month   • Alcohol use No     Current Outpatient Prescriptions   Medication Sig Dispense Refill   • tizanidine (ZANAFLEX) 4 MG Tab Take 1 Tab by mouth every 6 hours as needed. 30 Tab 3   • acetaminophen (TYLENOL) 500 MG Tab Take 1-2 Tabs by mouth every 6 hours as needed. 30 Tab 0   • alendronate (FOSAMAX) 35 MG tablet Take 1 Tab by mouth every 7 days. 4 Tab 11   • fluticasone (FLONASE) 50 MCG/ACT nasal spray Spray 2 Sprays in nose every day. 16 g 0   • ranitidine (ZANTAC) 150 MG Tab Take 1 Tab by mouth 2 times a day. 60 Tab 11     No current facility-administered medications for this visit.      Family History   Problem Relation Age of Onset   • Heart Disease Mother    • Dementia Father      Past Medical History:   Diagnosis Date   • OSTEOPOROSIS    • Ulcer (CMS-[facility])        Review of Systems   Musculoskeletal: Positive for back pain and joint pain.   All other systems reviewed and  are negative.         Objective:     /74   Pulse 82   Temp 36.2 °C (97.1 °F)   Resp 16   Ht 1.524 m (5')   Wt 63.5 kg (140 lb)   SpO2 95%   BMI 27.34 kg/m²      Physical Exam   Constitutional: She is oriented to person, place, and time. She appears well-developed and well-nourished. No distress.   HENT:   Head: Normocephalic and atraumatic.   Right Ear: External ear normal.   Left Ear: External ear normal.   Nose: Nose normal.   Eyes: Right eye exhibits no discharge. Left eye exhibits no discharge.   Neck: Normal range of motion. Neck supple. No thyromegaly present.   Cardiovascular: Normal rate, regular rhythm and normal heart sounds.  Exam reveals no gallop and no friction rub.    No murmur heard.  Pulmonary/Chest: Effort normal and breath sounds normal. She has no wheezes. She has no rales.   Musculoskeletal: She exhibits no edema or tenderness.   She reports tenderness to palpation over the left chest wall, left shoulder and left scapular area. She has good range of motion of her left arm and neck. 5/5 strength of upper extremities bilaterally.   Neurological: She is alert and oriented to person, place, and time. She displays normal reflexes.   Skin: Skin is warm and dry. No rash noted. She is not diaphoretic.   Psychiatric: She has a normal mood and affect. Her behavior is normal. Judgment and thought content normal.   Nursing note and vitals reviewed.              Assessment/Plan:     1. Cervical back pain with evidence of disc disease  Patient probably has some cervical radiculopathy and I will do x-rays of the cervical spine and get her into physical therapy. I explained that if symptoms worsen or do not improve over the next few weeks we may need MRI and neurosurgery referral. She will avoid anti-inflammatories because of her history of acid reflux and stay with Tylenol.  - DX-CERVICAL SPINE-2 OR 3 VIEWS; Future  - REFERRAL TO PHYSICAL THERAPY Reason for Therapy: Eval/Treat/Report  - tizanidine  (ZANAFLEX) 4 MG Tab; Take 1 Tab by mouth every 6 hours as needed.  Dispense: 30 Tab; Refill: 3  - acetaminophen (TYLENOL) 500 MG Tab; Take 1-2 Tabs by mouth every 6 hours as needed.  Dispense: 30 Tab; Refill: 0    2. Acute pain of left shoulder  Patient may use her muscle relaxer at night when she does not need to be alert.  - DX-SHOULDER 2+ LEFT; Future  - REFERRAL TO PHYSICAL THERAPY Reason for Therapy: Eval/Treat/Report  - tizanidine (ZANAFLEX) 4 MG Tab; Take 1 Tab by mouth every 6 hours as needed.  Dispense: 30 Tab; Refill: 3  - acetaminophen (TYLENOL) 500 MG Tab; Take 1-2 Tabs by mouth every 6 hours as needed.  Dispense: 30 Tab; Refill: 0    3. Chest wall pain  Patient's pain is reproducible but I explained that if it should become a dull pain occurring at rest she will need further workup.  - DX-CHEST-2 VIEWS; Future  - acetaminophen (TYLENOL) 500 MG Tab; Take 1-2 Tabs by mouth every 6 hours as needed.  Dispense: 30 Tab; Refill: 0

## 2018-01-29 RX ORDER — AZITHROMYCIN 250 MG/1
TABLET, FILM COATED ORAL
Qty: 1 QUANTITY SUFFICIENT | Refills: 0 | Status: SHIPPED | OUTPATIENT
Start: 2018-01-29 | End: 2018-10-01

## 2018-04-17 ENCOUNTER — APPOINTMENT (OUTPATIENT)
Dept: OBGYN | Facility: MEDICAL CENTER | Age: 58
End: 2018-04-17
Payer: COMMERCIAL

## 2018-06-19 ENCOUNTER — APPOINTMENT (OUTPATIENT)
Dept: RADIOLOGY | Facility: MEDICAL CENTER | Age: 58
End: 2018-06-19
Attending: NURSE PRACTITIONER
Payer: COMMERCIAL

## 2018-06-29 ENCOUNTER — HOSPITAL ENCOUNTER (OUTPATIENT)
Dept: RADIOLOGY | Facility: MEDICAL CENTER | Age: 58
End: 2018-06-29
Attending: NURSE PRACTITIONER
Payer: COMMERCIAL

## 2018-06-29 DIAGNOSIS — Z12.39 SCREENING BREAST EXAMINATION: ICD-10-CM

## 2018-06-29 PROCEDURE — 77067 SCR MAMMO BI INCL CAD: CPT

## 2018-06-29 PROCEDURE — 77080 DXA BONE DENSITY AXIAL: CPT

## 2018-10-01 ENCOUNTER — OFFICE VISIT (OUTPATIENT)
Dept: URGENT CARE | Facility: PHYSICIAN GROUP | Age: 58
End: 2018-10-01
Payer: COMMERCIAL

## 2018-10-01 VITALS
OXYGEN SATURATION: 97 % | BODY MASS INDEX: 28.12 KG/M2 | TEMPERATURE: 97.3 F | RESPIRATION RATE: 18 BRPM | WEIGHT: 144 LBS | SYSTOLIC BLOOD PRESSURE: 130 MMHG | HEART RATE: 79 BPM | DIASTOLIC BLOOD PRESSURE: 82 MMHG

## 2018-10-01 DIAGNOSIS — J06.9 UPPER RESPIRATORY TRACT INFECTION, UNSPECIFIED TYPE: ICD-10-CM

## 2018-10-01 LAB
INT CON NEG: NEGATIVE
INT CON POS: POSITIVE
S PYO AG THROAT QL: NORMAL

## 2018-10-01 PROCEDURE — 87880 STREP A ASSAY W/OPTIC: CPT | Performed by: PHYSICIAN ASSISTANT

## 2018-10-01 PROCEDURE — 99214 OFFICE O/P EST MOD 30 MIN: CPT | Performed by: PHYSICIAN ASSISTANT

## 2018-10-01 RX ORDER — BENZONATATE 100 MG/1
100-200 CAPSULE ORAL 3 TIMES DAILY PRN
Qty: 60 CAP | Refills: 0 | Status: SHIPPED | OUTPATIENT
Start: 2018-10-01 | End: 2018-11-05

## 2018-10-01 RX ORDER — AZITHROMYCIN 250 MG/1
TABLET, FILM COATED ORAL
Qty: 6 TAB | Refills: 0 | Status: SHIPPED | OUTPATIENT
Start: 2018-10-01 | End: 2018-11-05

## 2018-10-01 ASSESSMENT — ENCOUNTER SYMPTOMS
COUGH: 1
TROUBLE SWALLOWING: 1
SWOLLEN GLANDS: 0
DIZZINESS: 0
SORE THROAT: 1
VOMITING: 0
SPUTUM PRODUCTION: 1
SHORTNESS OF BREATH: 0
NECK PAIN: 0
DIARRHEA: 0
ABDOMINAL PAIN: 0
FEVER: 0
MUSCULOSKELETAL NEGATIVE: 1
NAUSEA: 0
CHILLS: 0

## 2018-10-01 NOTE — PROGRESS NOTES
Subjective:      Cherrie Godron is a 58 y.o. female who presents with Pharyngitis (x2 weeks, cough )       Patient is accompanied by her daughter.      Pharyngitis    This is a new problem. The current episode started 1 to 4 weeks ago (2 weeks). The problem has been gradually improving. Neither side of throat is experiencing more pain than the other. There has been no fever. The pain is mild. Associated symptoms include congestion, coughing and trouble swallowing. Pertinent negatives include no abdominal pain, diarrhea, ear discharge, ear pain, neck pain, shortness of breath, swollen glands or vomiting. She has had no exposure to strep or mono. She has tried nothing for the symptoms.     Patient presents to urgent care reporting a 2 week history of a cough that was originally dry, but has now turned productive. She also reports a sore throat that has been gradually improving. No fevers, chills, body aches, chest pain, or SOB. She does report a family member was recently diagnosed with pneumonia. No personal history of pneumonia or smoking. No recent use of antibiotics.     Review of Systems   Constitutional: Negative for chills and fever.   HENT: Positive for congestion, sore throat and trouble swallowing. Negative for ear discharge and ear pain.    Respiratory: Positive for cough and sputum production. Negative for shortness of breath.    Cardiovascular: Negative for chest pain.   Gastrointestinal: Negative for abdominal pain, diarrhea, nausea and vomiting.   Genitourinary: Negative.    Musculoskeletal: Negative.  Negative for neck pain.   Skin: Negative for rash.   Neurological: Negative for dizziness.        Objective:     /82 (BP Location: Right arm, Patient Position: Sitting, BP Cuff Size: Adult)   Pulse 79   Temp 36.3 °C (97.3 °F) (Temporal)   Resp 18   Wt 65.3 kg (144 lb)   SpO2 97%   BMI 28.12 kg/m²      Physical Exam   Constitutional: She is oriented to person, place, and time.  She appears well-developed and well-nourished. No distress.   HENT:   Head: Normocephalic and atraumatic.   Right Ear: Hearing, tympanic membrane, external ear and ear canal normal.   Left Ear: Hearing, tympanic membrane, external ear and ear canal normal.   Mouth/Throat: Posterior oropharyngeal erythema present. No oropharyngeal exudate.   Mild posterior oropharyngeal erythema without enlarged tonsils or exudates noted.    Eyes: Pupils are equal, round, and reactive to light. Conjunctivae are normal. Right eye exhibits no discharge. Left eye exhibits no discharge.   Neck: Normal range of motion.   Cardiovascular: Normal rate, regular rhythm and normal heart sounds.    No murmur heard.  Pulmonary/Chest: Effort normal and breath sounds normal. No respiratory distress. She has no wheezes. She has no rales.   Musculoskeletal: Normal range of motion.   Lymphadenopathy:     She has no cervical adenopathy.   Neurological: She is alert and oriented to person, place, and time.   Skin: Skin is warm and dry. She is not diaphoretic.   Psychiatric: She has a normal mood and affect. Her behavior is normal.   Nursing note and vitals reviewed.              PMH:  has a past medical history of OSTEOPOROSIS and Ulcer. She also has no past medical history of Diabetes.  MEDS:   Current Outpatient Prescriptions:   •  azithromycin (ZITHROMAX) 250 MG Tab, Take 2 tablets by mouth on day one, then 1 tablet by mouth on days two through five, Disp: 6 Tab, Rfl: 0  •  benzonatate (TESSALON) 100 MG Cap, Take 1-2 Caps by mouth 3 times a day as needed for Cough., Disp: 60 Cap, Rfl: 0  •  alendronate (FOSAMAX) 35 MG tablet, Take 1 Tab by mouth every 7 days., Disp: 4 Tab, Rfl: 11  •  tizanidine (ZANAFLEX) 4 MG Tab, Take 1 Tab by mouth every 6 hours as needed., Disp: 30 Tab, Rfl: 3  •  acetaminophen (TYLENOL) 500 MG Tab, Take 1-2 Tabs by mouth every 6 hours as needed., Disp: 30 Tab, Rfl: 0  •  fluticasone (FLONASE) 50 MCG/ACT nasal spray, Spray 2  Sprays in nose every day., Disp: 16 g, Rfl: 0  •  ranitidine (ZANTAC) 150 MG Tab, Take 1 Tab by mouth 2 times a day., Disp: 60 Tab, Rfl: 11  ALLERGIES:   Allergies   Allergen Reactions   • Pcn [Penicillins]      SURGHX:   Past Surgical History:   Procedure Laterality Date   • TUBAL LIGATION       SOCHX:  reports that she has quit smoking. Her smoking use included Cigarettes. She has a 0.50 pack-year smoking history. She has never used smokeless tobacco. She reports that she does not drink alcohol or use drugs.  FH: family history includes Breast Cancer in her sister; Dementia in her father; Heart Disease in her mother.    Assessment/Plan:     1. Upper respiratory tract infection, unspecified type  - POCT Rapid Strep A: NEGATIVE  - azithromycin (ZITHROMAX) 250 MG Tab; Take 2 tablets by mouth on day one, then 1 tablet by mouth on days two through five  Dispense: 6 Tab; Refill: 0   - Complete full course of antibiotics as prescribed   - benzonatate (TESSALON) 100 MG Cap; Take 1-2 Caps by mouth 3 times a day as needed for Cough.  Dispense: 60 Cap; Refill: 0    Call or return to office if symptoms persist or worsen, would recommend CXR at that time. The patient demonstrated a good understanding and agreed with the treatment plan.

## 2018-10-01 NOTE — LETTER
October 1, 2018         Patient: Cherrie Gordon   YOB: 1960   Date of Visit: 10/1/2018           To Whom it May Concern:    Cherrie Gordon was seen in my clinic on 10/1/2018. Please excuse her absence from 10/1/18-10/2/18.     If you have any questions or concerns, please don't hesitate to call.        Sincerely,           Reny Gaines P.A.-C.  Electronically Signed

## 2018-10-15 DIAGNOSIS — M81.0 AGE-RELATED OSTEOPOROSIS WITHOUT CURRENT PATHOLOGICAL FRACTURE: ICD-10-CM

## 2018-10-15 RX ORDER — ALENDRONATE SODIUM 35 MG/1
TABLET ORAL
Qty: 4 TAB | Refills: 3 | Status: SHIPPED | OUTPATIENT
Start: 2018-10-15 | End: 2018-10-20 | Stop reason: SDUPTHER

## 2018-10-20 DIAGNOSIS — M81.0 AGE-RELATED OSTEOPOROSIS WITHOUT CURRENT PATHOLOGICAL FRACTURE: ICD-10-CM

## 2018-10-22 DIAGNOSIS — M81.0 AGE-RELATED OSTEOPOROSIS WITHOUT CURRENT PATHOLOGICAL FRACTURE: ICD-10-CM

## 2018-10-22 RX ORDER — ALENDRONATE SODIUM 35 MG/1
TABLET ORAL
Qty: 4 TAB | Refills: 3 | Status: SHIPPED | OUTPATIENT
Start: 2018-10-22 | End: 2018-10-22 | Stop reason: SDUPTHER

## 2018-10-22 RX ORDER — ALENDRONATE SODIUM 35 MG/1
35 TABLET ORAL
Qty: 4 TAB | Refills: 3 | Status: SHIPPED | OUTPATIENT
Start: 2018-10-22 | End: 2018-11-03 | Stop reason: SDUPTHER

## 2018-11-03 DIAGNOSIS — M81.0 AGE-RELATED OSTEOPOROSIS WITHOUT CURRENT PATHOLOGICAL FRACTURE: ICD-10-CM

## 2018-11-05 ENCOUNTER — OFFICE VISIT (OUTPATIENT)
Dept: MEDICAL GROUP | Facility: MEDICAL CENTER | Age: 58
End: 2018-11-05
Payer: COMMERCIAL

## 2018-11-05 VITALS
TEMPERATURE: 97.6 F | DIASTOLIC BLOOD PRESSURE: 70 MMHG | SYSTOLIC BLOOD PRESSURE: 122 MMHG | HEART RATE: 85 BPM | WEIGHT: 140 LBS | HEIGHT: 60 IN | BODY MASS INDEX: 27.48 KG/M2 | OXYGEN SATURATION: 97 % | RESPIRATION RATE: 16 BRPM

## 2018-11-05 DIAGNOSIS — J06.9 UPPER RESPIRATORY TRACT INFECTION, UNSPECIFIED TYPE: ICD-10-CM

## 2018-11-05 PROCEDURE — 99213 OFFICE O/P EST LOW 20 MIN: CPT | Performed by: NURSE PRACTITIONER

## 2018-11-05 RX ORDER — AZITHROMYCIN 250 MG/1
TABLET, FILM COATED ORAL
Qty: 1 QUANTITY SUFFICIENT | Refills: 0 | Status: SHIPPED | OUTPATIENT
Start: 2018-11-05 | End: 2018-11-30

## 2018-11-05 RX ORDER — ALENDRONATE SODIUM 35 MG/1
TABLET ORAL
Qty: 4 TAB | Refills: 2 | Status: SHIPPED | OUTPATIENT
Start: 2018-11-05 | End: 2018-11-13 | Stop reason: SDUPTHER

## 2018-11-05 ASSESSMENT — ENCOUNTER SYMPTOMS
COUGH: 1
SORE THROAT: 1
HEADACHES: 1

## 2018-11-05 ASSESSMENT — PATIENT HEALTH QUESTIONNAIRE - PHQ9: CLINICAL INTERPRETATION OF PHQ2 SCORE: 0

## 2018-11-05 NOTE — PROGRESS NOTES
Subjective:      Cherrie Gordon is a 58 y.o. female who presents with Follow-Up (urgent care)        CC: Patient here today accompanied by her daughter for upper respiratory symptoms.    HPI Cherrie Gordon        1. Upper respiratory tract infection, unspecified type  Patient was seen at urgent care approximately a month ago for upper respiratory symptoms and was started on a Z-Talat and Tessalon Perles.  She states she got better after about a week.  She was doing well up until about 3 days ago when she developed cough, headache, rhinorrhea, and sore throat.  Nothing makes her feel better including over-the-counter medicines.  Symptoms are worse when she is active.  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    Patient also continues to be overdue for Pap smear.  Current Outpatient Prescriptions   Medication Sig Dispense Refill   • alendronate (FOSAMAX) 35 MG tablet TAKE 1 TABLET BY MOUTH EVERY 7 DAYS 4 Tab 2   • azithromycin (ZITHROMAX Z-TAALT) 250 MG Tab One Pack 1 Quantity Sufficient 0   • acetaminophen (TYLENOL) 500 MG Tab Take 1-2 Tabs by mouth every 6 hours as needed. 30 Tab 0     No current facility-administered medications for this visit.      Social History   Substance Use Topics   • Smoking status: Former Smoker     Packs/day: 0.25     Years: 2.00     Types: Cigarettes   • Smokeless tobacco: Never Used      Comment: started smoking at age 32 1 pack per month   • Alcohol use No     Past Medical History:   Diagnosis Date   • OSTEOPOROSIS    • Ulcer      Family History   Problem Relation Age of Onset   • Heart Disease Mother    • Dementia Father    • Breast Cancer Sister        Review of Systems   HENT: Positive for congestion and sore throat.    Respiratory: Positive for cough.    Neurological: Positive for headaches.   All other systems reviewed and are negative.         Objective:     /70 (BP Location: Right arm, Patient Position: Sitting, BP Cuff Size: Adult)    Pulse 85   Temp 36.4 °C (97.6 °F) (Temporal)   Resp 16   Ht 1.524 m (5')   Wt 63.5 kg (140 lb)   SpO2 97%   BMI 27.34 kg/m²      Physical Exam   Constitutional: She is oriented to person, place, and time. She appears well-developed and well-nourished. No distress.   HENT:   Head: Normocephalic and atraumatic.   Right Ear: External ear normal.   Left Ear: External ear normal.   Nose: Rhinorrhea present.   Eyes: Right eye exhibits no discharge. Left eye exhibits no discharge.   Neck: Normal range of motion. Neck supple. No thyromegaly present.   Cardiovascular: Normal rate, regular rhythm and normal heart sounds.  Exam reveals no gallop and no friction rub.    No murmur heard.  Pulmonary/Chest: Effort normal and breath sounds normal. She has no wheezes. She has no rales.   Musculoskeletal: She exhibits no edema or tenderness.   Neurological: She is alert and oriented to person, place, and time. She displays normal reflexes.   Skin: Skin is warm and dry. No rash noted. She is not diaphoretic.   Psychiatric: She has a normal mood and affect. Her behavior is normal. Judgment and thought content normal.   Nursing note and vitals reviewed.              Assessment/Plan:     1. Upper respiratory tract infection, unspecified type  Teaching included:  A. Drink plenty of fluids to keep yourself hydrated. Warm fluids like tea and hot soup are better.  B. Increase humidity in the house with a humidifier or place your head over a steaming pot.  C. Use Tylenol or Motrin for aches, pains, or fever.  D. Get plenty of rest to allow your body time to heal.  E. Use OTC Mucinex to loosen mucous.  F. Try OTC Afrin for up to 3 days for nasal congestion. Avoid Sudafed products.  G. Contact the office or return for appt. if you develope worsening symptoms or do not improve in the next week.  H. Avoid lung irritants such as tobacco smoke or blowing dust.  - azithromycin (ZITHROMAX Z-TALAT) 250 MG Tab; One Pack  Dispense: 1 Quantity  Sufficient; Refill: 0.  Patient to start medicine if no improvement over the next few days.    Patient to make an appointment for Pap smear and prefers a female so I will have the  schedule this for patient with 1 of my colleagues.

## 2018-11-05 NOTE — LETTER
November 5, 2018       Patient: Cherrie Gordon   YOB: 1960   Date of Visit: 11/5/2018         To Whom It May Concern:    It is my medical opinion that Cherrie Gordon remain out of work until 11/7/18.    If you have any questions or concerns, please don't hesitate to call [phone]          Sincerely,          Shahriar Schwab A.P.N.  Electronically Signed

## 2018-11-13 DIAGNOSIS — M81.0 AGE-RELATED OSTEOPOROSIS WITHOUT CURRENT PATHOLOGICAL FRACTURE: ICD-10-CM

## 2018-11-14 RX ORDER — ALENDRONATE SODIUM 35 MG/1
35 TABLET ORAL
Qty: 4 TAB | Refills: 2 | Status: SHIPPED | OUTPATIENT
Start: 2018-11-14 | End: 2019-01-04 | Stop reason: SDUPTHER

## 2018-11-30 ENCOUNTER — HOSPITAL ENCOUNTER (OUTPATIENT)
Facility: MEDICAL CENTER | Age: 58
End: 2018-11-30
Attending: NURSE PRACTITIONER
Payer: COMMERCIAL

## 2018-11-30 ENCOUNTER — OFFICE VISIT (OUTPATIENT)
Dept: MEDICAL GROUP | Facility: MEDICAL CENTER | Age: 58
End: 2018-11-30
Payer: COMMERCIAL

## 2018-11-30 VITALS
HEIGHT: 60 IN | RESPIRATION RATE: 16 BRPM | TEMPERATURE: 97.2 F | BODY MASS INDEX: 28.27 KG/M2 | OXYGEN SATURATION: 99 % | SYSTOLIC BLOOD PRESSURE: 102 MMHG | WEIGHT: 144 LBS | DIASTOLIC BLOOD PRESSURE: 70 MMHG | HEART RATE: 69 BPM

## 2018-11-30 DIAGNOSIS — Z01.419 WELL WOMAN EXAM WITH ROUTINE GYNECOLOGICAL EXAM: ICD-10-CM

## 2018-11-30 DIAGNOSIS — Z12.4 PAP SMEAR FOR CERVICAL CANCER SCREENING: ICD-10-CM

## 2018-11-30 DIAGNOSIS — M81.0 OSTEOPOROSIS, UNSPECIFIED OSTEOPOROSIS TYPE, UNSPECIFIED PATHOLOGICAL FRACTURE PRESENCE: ICD-10-CM

## 2018-11-30 DIAGNOSIS — E78.5 DYSLIPIDEMIA: ICD-10-CM

## 2018-11-30 DIAGNOSIS — Q27.9: ICD-10-CM

## 2018-11-30 DIAGNOSIS — Z78.0 POST-MENOPAUSAL: ICD-10-CM

## 2018-11-30 DIAGNOSIS — Z23 NEED FOR VACCINATION: ICD-10-CM

## 2018-11-30 PROCEDURE — 99396 PREV VISIT EST AGE 40-64: CPT | Mod: 25 | Performed by: NURSE PRACTITIONER

## 2018-11-30 PROCEDURE — 90686 IIV4 VACC NO PRSV 0.5 ML IM: CPT | Performed by: NURSE PRACTITIONER

## 2018-11-30 PROCEDURE — 87624 HPV HI-RISK TYP POOLED RSLT: CPT

## 2018-11-30 PROCEDURE — 90471 IMMUNIZATION ADMIN: CPT | Performed by: NURSE PRACTITIONER

## 2018-11-30 PROCEDURE — 88175 CYTOPATH C/V AUTO FLUID REDO: CPT

## 2018-11-30 NOTE — PROGRESS NOTES
CC:  Pap/Well Woman Exam    History of present illness:    Cherrie Gordon is 58 y.o. female presenting today for well woman exam with gynecological exam and Pap smear.   She reports her periods are absent.  She is post menopausal. She is currently using  nothing as birth control. . Last pap was 6 years ago and it was normal. Some discomfort when having intercourse and some vaginal dryness. No dysuria, no abnormal vaginal discharge, no abnormal vaginal bleeding.     Past Medical History:   Diagnosis Date   • OSTEOPOROSIS    • Ulcer        Past Surgical History:   Procedure Laterality Date   • TUBAL LIGATION         Outpatient Encounter Prescriptions as of 2018   Medication Sig Dispense Refill   • alendronate (FOSAMAX) 35 MG tablet Take 1 Tab by mouth every 7 days. 4 Tab 2   • [DISCONTINUED] azithromycin (ZITHROMAX Z-TALAT) 250 MG Tab One Pack (Patient not taking: Reported on 2018) 1 Quantity Sufficient 0   • [DISCONTINUED] acetaminophen (TYLENOL) 500 MG Tab Take 1-2 Tabs by mouth every 6 hours as needed. 30 Tab 0     No facility-administered encounter medications on file as of 2018.        Patient Active Problem List    Diagnosis Date Noted   • Polyp of colon 2018   • Age-related osteoporosis without current pathological fracture 2017   • Primary insomnia 2016   • Gastroesophageal reflux disease without esophagitis 2016       .  Social History     Social History   • Marital status:      Spouse name: N/A   • Number of children: N/A   • Years of education: N/A     Occupational History   • Not on file.     Social History Main Topics   • Smoking status: Former Smoker     Packs/day: 0.25     Years: 2.00     Types: Cigarettes   • Smokeless tobacco: Never Used      Comment: started smoking at age 32 1 pack per month   • Alcohol use No   • Drug use: No   • Sexual activity: Yes     Partners: Male     Other Topics Concern   • Not on file     Social  History Narrative   • No narrative on file       Family History   Problem Relation Age of Onset   • Heart Disease Mother    • Dementia Father    • Breast Cancer Sister          ROS: Denies Weight loss, fatigue, chest pain, SOB, bowel or bladder changes.  Denies musculoskeletal, neurological, or psychiatric problems.  Denies dysuria, dyspareunia or abnormal vaginal discharge.      /70   Pulse 69   Temp 36.2 °C (97.2 °F)   Resp 16   Ht 1.524 m (5')   Wt 65.3 kg (144 lb)   SpO2 99%   BMI 28.12 kg/m²     GEN:  Appears well and in no apparent distress   NECK:  Supple without adenopathy or thyromegaly  LUNGS:  Clear to auscultation.  Normal breath sounds.  CV:  RRR without murmers or S3 or S4.  Peripheral pulses intact.  BREAST:  Deferred-recent mammo completed.   ABD:  Soft, non-tender, non-distended, normal bowel sounds.  No hepatosplenomegaly.  :  Normal external female genitalia.  Vaginal atrophy, Vaginal canal clear.  Cervix appears normal.  Bimanual exam:  No CMT, normal size uterus without masses or tenderness; no adnexal masses or tenderness.      Assessment and plan      1. Well woman exam with routine gynecological exam  - THINPREP PAP WITH HPV; Future    2. Pap smear for cervical cancer screening  - THINPREP PAP WITH HPV; Future    3. Post-menopausal    4. Dyslipidemia  - Lipid Profile; Future    5. Osteoporosis, unspecified osteoporosis type, unspecified pathological fracture presence  Advised her to take Caltrate twice daily and perform weight bearing exercises.  - COMP METABOLIC PANEL; Future  - TSH WITH REFLEX TO FT4; Future  - VITAMIN D,25 HYDROXY; Future    6. Need for vaccination  - Influenza Vaccine Quad Injection >3Y (PF)    7. Congenital abnormality of vein  Requesting new referral to vascular surgery  - REFERRAL TO VASCULAR SURGERY        Encouraged patient to have a heart healthy diet, rich in fruits and vegetables. Limit alcohol use, avoid tobacco products. Exercise at least 3-5  times weekly. Take a multivitamin daily.    A chaperone was offered to the patient during today's exam. Chaperone name: Nidia was present.    I have placed the below orders and discussed them with an approved delegating provider.  The MA is performing the below orders under the direction of Dr. Price.      Pap Smear  Specimen collected today will await results from the lab.

## 2018-12-03 LAB
CYTOLOGY REG CYTOL: NORMAL
HPV HR 12 DNA CVX QL NAA+PROBE: NEGATIVE
HPV16 DNA SPEC QL NAA+PROBE: NEGATIVE
HPV18 DNA SPEC QL NAA+PROBE: NEGATIVE
SPECIMEN SOURCE: NORMAL

## 2019-01-04 DIAGNOSIS — M81.0 AGE-RELATED OSTEOPOROSIS WITHOUT CURRENT PATHOLOGICAL FRACTURE: ICD-10-CM

## 2019-01-04 RX ORDER — ALENDRONATE SODIUM 35 MG/1
35 TABLET ORAL
Qty: 4 TAB | Refills: 9 | Status: SHIPPED | OUTPATIENT
Start: 2019-01-04 | End: 2019-10-28 | Stop reason: SDUPTHER

## 2019-05-13 ENCOUNTER — HOSPITAL ENCOUNTER (OUTPATIENT)
Dept: LAB | Facility: MEDICAL CENTER | Age: 59
End: 2019-05-13
Attending: NURSE PRACTITIONER
Payer: COMMERCIAL

## 2019-05-13 DIAGNOSIS — E78.5 DYSLIPIDEMIA: ICD-10-CM

## 2019-05-13 DIAGNOSIS — M81.0 OSTEOPOROSIS, UNSPECIFIED OSTEOPOROSIS TYPE, UNSPECIFIED PATHOLOGICAL FRACTURE PRESENCE: ICD-10-CM

## 2019-05-13 LAB
25(OH)D3 SERPL-MCNC: 25 NG/ML (ref 30–100)
ALBUMIN SERPL BCP-MCNC: 4.2 G/DL (ref 3.2–4.9)
ALBUMIN/GLOB SERPL: 1.6 G/DL
ALP SERPL-CCNC: 49 U/L (ref 30–99)
ALT SERPL-CCNC: 25 U/L (ref 2–50)
ANION GAP SERPL CALC-SCNC: 6 MMOL/L (ref 0–11.9)
AST SERPL-CCNC: 23 U/L (ref 12–45)
BILIRUB SERPL-MCNC: 0.3 MG/DL (ref 0.1–1.5)
BUN SERPL-MCNC: 20 MG/DL (ref 8–22)
CALCIUM SERPL-MCNC: 9.3 MG/DL (ref 8.5–10.5)
CHLORIDE SERPL-SCNC: 110 MMOL/L (ref 96–112)
CHOLEST SERPL-MCNC: 194 MG/DL (ref 100–199)
CO2 SERPL-SCNC: 25 MMOL/L (ref 20–33)
CREAT SERPL-MCNC: 0.68 MG/DL (ref 0.5–1.4)
FASTING STATUS PATIENT QL REPORTED: NORMAL
GLOBULIN SER CALC-MCNC: 2.7 G/DL (ref 1.9–3.5)
GLUCOSE SERPL-MCNC: 91 MG/DL (ref 65–99)
HDLC SERPL-MCNC: 53 MG/DL
LDLC SERPL CALC-MCNC: 127 MG/DL
POTASSIUM SERPL-SCNC: 4.2 MMOL/L (ref 3.6–5.5)
PROT SERPL-MCNC: 6.9 G/DL (ref 6–8.2)
SODIUM SERPL-SCNC: 141 MMOL/L (ref 135–145)
TRIGL SERPL-MCNC: 72 MG/DL (ref 0–149)

## 2019-05-13 PROCEDURE — 80061 LIPID PANEL: CPT

## 2019-05-13 PROCEDURE — 80053 COMPREHEN METABOLIC PANEL: CPT

## 2019-05-13 PROCEDURE — 82306 VITAMIN D 25 HYDROXY: CPT

## 2019-05-13 PROCEDURE — 36415 COLL VENOUS BLD VENIPUNCTURE: CPT

## 2019-05-13 PROCEDURE — 84443 ASSAY THYROID STIM HORMONE: CPT

## 2019-05-14 LAB — TSH SERPL DL<=0.005 MIU/L-ACNC: 2.78 UIU/ML (ref 0.38–5.33)

## 2019-07-12 ENCOUNTER — HOSPITAL ENCOUNTER (OUTPATIENT)
Dept: RADIOLOGY | Facility: MEDICAL CENTER | Age: 59
End: 2019-07-12
Attending: NURSE PRACTITIONER
Payer: COMMERCIAL

## 2019-07-12 DIAGNOSIS — Z12.31 VISIT FOR SCREENING MAMMOGRAM: ICD-10-CM

## 2019-07-12 PROCEDURE — 77063 BREAST TOMOSYNTHESIS BI: CPT

## 2020-02-03 ENCOUNTER — OFFICE VISIT (OUTPATIENT)
Dept: MEDICAL GROUP | Facility: MEDICAL CENTER | Age: 60
End: 2020-02-03
Payer: COMMERCIAL

## 2020-02-03 VITALS
WEIGHT: 144 LBS | SYSTOLIC BLOOD PRESSURE: 124 MMHG | HEIGHT: 61 IN | DIASTOLIC BLOOD PRESSURE: 70 MMHG | RESPIRATION RATE: 16 BRPM | BODY MASS INDEX: 27.19 KG/M2 | OXYGEN SATURATION: 97 % | HEART RATE: 68 BPM

## 2020-02-03 DIAGNOSIS — Z23 NEED FOR TDAP VACCINATION: ICD-10-CM

## 2020-02-03 DIAGNOSIS — M81.0 AGE-RELATED OSTEOPOROSIS WITHOUT CURRENT PATHOLOGICAL FRACTURE: ICD-10-CM

## 2020-02-03 DIAGNOSIS — Z00.00 ROUTINE GENERAL MEDICAL EXAMINATION AT A HEALTH CARE FACILITY: ICD-10-CM

## 2020-02-03 DIAGNOSIS — Z23 NEED FOR INFLUENZA VACCINATION: ICD-10-CM

## 2020-02-03 PROCEDURE — 90471 IMMUNIZATION ADMIN: CPT | Performed by: NURSE PRACTITIONER

## 2020-02-03 PROCEDURE — 99213 OFFICE O/P EST LOW 20 MIN: CPT | Mod: 25 | Performed by: NURSE PRACTITIONER

## 2020-02-03 PROCEDURE — 90686 IIV4 VACC NO PRSV 0.5 ML IM: CPT | Performed by: NURSE PRACTITIONER

## 2020-02-03 PROCEDURE — 90472 IMMUNIZATION ADMIN EACH ADD: CPT | Performed by: NURSE PRACTITIONER

## 2020-02-03 PROCEDURE — 90715 TDAP VACCINE 7 YRS/> IM: CPT | Performed by: NURSE PRACTITIONER

## 2020-02-03 RX ORDER — ALENDRONATE SODIUM 35 MG/1
TABLET ORAL
Qty: 4 TAB | Refills: 11 | Status: SHIPPED | OUTPATIENT
Start: 2020-02-03 | End: 2020-02-12 | Stop reason: SDUPTHER

## 2020-02-03 ASSESSMENT — PATIENT HEALTH QUESTIONNAIRE - PHQ9: CLINICAL INTERPRETATION OF PHQ2 SCORE: 0

## 2020-02-03 NOTE — PROGRESS NOTES
Subjective:      Cherrie Gordon is a 59 y.o. female who presents with Medication Refill        CC: Patient is here today for yearly follow-up on osteoporosis and general checkup.  She has not been to the office since January of last year.    HPI       1. Age-related osteoporosis without current pathological fracture  Patient continues on Fosamax weekly although she has been out of the medicines for 1 month.  She is on the medication because she had a bone density scan done in the past showing osteoporosis.  Her last bone density from January 2018 showed osteoporosis.  She reports no side effects from the medicine.    2. Routine general medical examination at a health care facility  Patient due for yearly lab work in May with previous lab work fairly normal.    3. Need for Tdap vaccination  Patient has not had this in 10 years    4. Need for influenza vaccination  Patient states she has not had flu vaccine this year.  Past Medical History:   Diagnosis Date   • OSTEOPOROSIS    • Ulcer      Social History     Socioeconomic History   • Marital status:      Spouse name: Not on file   • Number of children: Not on file   • Years of education: Not on file   • Highest education level: Not on file   Occupational History   • Not on file   Social Needs   • Financial resource strain: Not on file   • Food insecurity:     Worry: Not on file     Inability: Not on file   • Transportation needs:     Medical: Not on file     Non-medical: Not on file   Tobacco Use   • Smoking status: Former Smoker     Packs/day: 0.25     Years: 2.00     Pack years: 0.50     Types: Cigarettes   • Smokeless tobacco: Never Used   • Tobacco comment: started smoking at age 32 1 pack per month   Substance and Sexual Activity   • Alcohol use: No   • Drug use: No   • Sexual activity: Yes     Partners: Male   Lifestyle   • Physical activity:     Days per week: Not on file     Minutes per session: Not on file   • Stress: Not on file  "  Relationships   • Social connections:     Talks on phone: Not on file     Gets together: Not on file     Attends Rastafarian service: Not on file     Active member of club or organization: Not on file     Attends meetings of clubs or organizations: Not on file     Relationship status: Not on file   • Intimate partner violence:     Fear of current or ex partner: Not on file     Emotionally abused: Not on file     Physically abused: Not on file     Forced sexual activity: Not on file   Other Topics Concern   • Not on file   Social History Narrative   • Not on file     Current Outpatient Medications   Medication Sig Dispense Refill   • alendronate (FOSAMAX) 35 MG tablet TAKE 1 TABLET BY MOUTH ONCE A WEEK 4 Tab 11     No current facility-administered medications for this visit.      Family History   Problem Relation Age of Onset   • Heart Disease Mother    • Dementia Father    • Breast Cancer Sister          Review of Systems   All other systems reviewed and are negative.         Objective:     /70 (BP Location: Right arm, Patient Position: Sitting, BP Cuff Size: Adult)   Pulse 68   Resp 16   Ht 1.549 m (5' 1\")   Wt 65.3 kg (144 lb)   SpO2 97%   BMI 27.21 kg/m²      Physical Exam  Vitals signs and nursing note reviewed.   Constitutional:       General: She is not in acute distress.     Appearance: She is well-developed. She is not diaphoretic.   HENT:      Head: Normocephalic and atraumatic.      Right Ear: External ear normal.      Left Ear: External ear normal.      Nose: Nose normal.   Eyes:      General:         Right eye: No discharge.         Left eye: No discharge.   Neck:      Musculoskeletal: Normal range of motion and neck supple.      Thyroid: No thyromegaly.   Cardiovascular:      Rate and Rhythm: Normal rate and regular rhythm.      Heart sounds: Normal heart sounds. No murmur. No friction rub. No gallop.    Pulmonary:      Effort: Pulmonary effort is normal.      Breath sounds: Normal breath " sounds. No wheezing or rales.   Musculoskeletal:         General: No tenderness.   Skin:     General: Skin is warm and dry.      Findings: No rash.   Neurological:      Mental Status: She is alert and oriented to person, place, and time.      Deep Tendon Reflexes: Reflexes normal.   Psychiatric:         Behavior: Behavior normal.         Thought Content: Thought content normal.         Judgment: Judgment normal.                 Assessment/Plan:       1. Age-related osteoporosis without current pathological fracture  I will have patient do a bone density scan to see if her Fosamax is working.  Her last bone density was 2 years ago.  She has had no problems with the medications.  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  She has had no recent fractures.  - alendronate (FOSAMAX) 35 MG tablet; TAKE 1 TABLET BY MOUTH ONCE A WEEK  Dispense: 4 Tab; Refill: 11  - DS-BONE DENSITY STUDY (DEXA); Future    2. Routine general medical examination at a health care facility  Patient advised she is due for lab work in May.  - HEP C VIRUS ANTIBODY; Future  - Comp Metabolic Panel; Future  - Lipid Profile; Future    3. Need for Tdap vaccination  I have placed the below orders and discussed them with an approved delegating provider. The MA is performing the below orders under the direction of Dr. Kay    - Tdap =>6yo IM    4. Need for influenza vaccination  I have placed the below orders and discussed them with an approved delegating provider. The MA is performing the below orders under the direction of Dr. Kay    - Influenza Vaccine Quad Injection (PF)

## 2020-02-12 DIAGNOSIS — M81.0 AGE-RELATED OSTEOPOROSIS WITHOUT CURRENT PATHOLOGICAL FRACTURE: ICD-10-CM

## 2020-02-12 RX ORDER — ALENDRONATE SODIUM 35 MG/1
TABLET ORAL
Qty: 4 TAB | Refills: 11 | Status: SHIPPED | OUTPATIENT
Start: 2020-02-12

## 2021-02-13 ENCOUNTER — HOSPITAL ENCOUNTER (OUTPATIENT)
Dept: LAB | Facility: MEDICAL CENTER | Age: 61
End: 2021-02-13
Attending: STUDENT IN AN ORGANIZED HEALTH CARE EDUCATION/TRAINING PROGRAM
Payer: COMMERCIAL

## 2021-02-13 LAB
ALBUMIN SERPL BCP-MCNC: 4.3 G/DL (ref 3.2–4.9)
ALBUMIN/GLOB SERPL: 1.6 G/DL
ALP SERPL-CCNC: 57 U/L (ref 30–99)
ALT SERPL-CCNC: 26 U/L (ref 2–50)
ANION GAP SERPL CALC-SCNC: 9 MMOL/L (ref 7–16)
APPEARANCE UR: CLEAR
AST SERPL-CCNC: 27 U/L (ref 12–45)
BASOPHILS # BLD AUTO: 0.4 % (ref 0–1.8)
BASOPHILS # BLD: 0.02 K/UL (ref 0–0.12)
BILIRUB SERPL-MCNC: 0.5 MG/DL (ref 0.1–1.5)
BILIRUB UR QL STRIP.AUTO: NEGATIVE
BUN SERPL-MCNC: 13 MG/DL (ref 8–22)
CALCIUM SERPL-MCNC: 9.2 MG/DL (ref 8.5–10.5)
CHLORIDE SERPL-SCNC: 109 MMOL/L (ref 96–112)
CHOLEST SERPL-MCNC: 196 MG/DL (ref 100–199)
CO2 SERPL-SCNC: 22 MMOL/L (ref 20–33)
COLOR UR: YELLOW
CREAT SERPL-MCNC: 0.68 MG/DL (ref 0.5–1.4)
CREAT UR-MCNC: 195.85 MG/DL
CRP SERPL HS-MCNC: 0.08 MG/DL (ref 0–0.75)
EOSINOPHIL # BLD AUTO: 0.14 K/UL (ref 0–0.51)
EOSINOPHIL NFR BLD: 2.9 % (ref 0–6.9)
ERYTHROCYTE [DISTWIDTH] IN BLOOD BY AUTOMATED COUNT: 41.3 FL (ref 35.9–50)
ERYTHROCYTE [SEDIMENTATION RATE] IN BLOOD BY WESTERGREN METHOD: 3 MM/HOUR (ref 0–30)
EST. AVERAGE GLUCOSE BLD GHB EST-MCNC: 126 MG/DL
GLOBULIN SER CALC-MCNC: 2.7 G/DL (ref 1.9–3.5)
GLUCOSE SERPL-MCNC: 92 MG/DL (ref 65–99)
GLUCOSE UR STRIP.AUTO-MCNC: NEGATIVE MG/DL
HBA1C MFR BLD: 6 % (ref 0–5.6)
HCT VFR BLD AUTO: 42.6 % (ref 37–47)
HDLC SERPL-MCNC: 60 MG/DL
HGB BLD-MCNC: 13.8 G/DL (ref 12–16)
IMM GRANULOCYTES # BLD AUTO: 0 K/UL (ref 0–0.11)
IMM GRANULOCYTES NFR BLD AUTO: 0 % (ref 0–0.9)
KETONES UR STRIP.AUTO-MCNC: NEGATIVE MG/DL
LDLC SERPL CALC-MCNC: 125 MG/DL
LEUKOCYTE ESTERASE UR QL STRIP.AUTO: NEGATIVE
LYMPHOCYTES # BLD AUTO: 2.11 K/UL (ref 1–4.8)
LYMPHOCYTES NFR BLD: 43.5 % (ref 22–41)
MCH RBC QN AUTO: 27.5 PG (ref 27–33)
MCHC RBC AUTO-ENTMCNC: 32.4 G/DL (ref 33.6–35)
MCV RBC AUTO: 85 FL (ref 81.4–97.8)
MICRO URNS: NORMAL
MICROALBUMIN UR-MCNC: <1.2 MG/DL
MICROALBUMIN/CREAT UR: NORMAL MG/G (ref 0–30)
MONOCYTES # BLD AUTO: 0.39 K/UL (ref 0–0.85)
MONOCYTES NFR BLD AUTO: 8 % (ref 0–13.4)
NEUTROPHILS # BLD AUTO: 2.19 K/UL (ref 2–7.15)
NEUTROPHILS NFR BLD: 45.2 % (ref 44–72)
NITRITE UR QL STRIP.AUTO: NEGATIVE
NRBC # BLD AUTO: 0 K/UL
NRBC BLD-RTO: 0 /100 WBC
PH UR STRIP.AUTO: 6 [PH] (ref 5–8)
PLATELET # BLD AUTO: 230 K/UL (ref 164–446)
PMV BLD AUTO: 11.6 FL (ref 9–12.9)
POTASSIUM SERPL-SCNC: 3.9 MMOL/L (ref 3.6–5.5)
PROT SERPL-MCNC: 7 G/DL (ref 6–8.2)
PROT UR QL STRIP: NEGATIVE MG/DL
RBC # BLD AUTO: 5.01 M/UL (ref 4.2–5.4)
RBC UR QL AUTO: NEGATIVE
RHEUMATOID FACT SER IA-ACNC: <10 IU/ML (ref 0–14)
SODIUM SERPL-SCNC: 140 MMOL/L (ref 135–145)
SP GR UR STRIP.AUTO: >=1.03
TRIGL SERPL-MCNC: 57 MG/DL (ref 0–149)
TSH SERPL DL<=0.005 MIU/L-ACNC: 1.62 UIU/ML (ref 0.38–5.33)
UROBILINOGEN UR STRIP.AUTO-MCNC: 0.2 MG/DL
WBC # BLD AUTO: 4.9 K/UL (ref 4.8–10.8)

## 2021-02-13 PROCEDURE — 86431 RHEUMATOID FACTOR QUANT: CPT

## 2021-02-13 PROCEDURE — 82570 ASSAY OF URINE CREATININE: CPT

## 2021-02-13 PROCEDURE — 84443 ASSAY THYROID STIM HORMONE: CPT

## 2021-02-13 PROCEDURE — 80061 LIPID PANEL: CPT

## 2021-02-13 PROCEDURE — 83036 HEMOGLOBIN GLYCOSYLATED A1C: CPT

## 2021-02-13 PROCEDURE — 36415 COLL VENOUS BLD VENIPUNCTURE: CPT

## 2021-02-13 PROCEDURE — 86140 C-REACTIVE PROTEIN: CPT

## 2021-02-13 PROCEDURE — 86200 CCP ANTIBODY: CPT

## 2021-02-13 PROCEDURE — 85025 COMPLETE CBC W/AUTO DIFF WBC: CPT

## 2021-02-13 PROCEDURE — 81003 URINALYSIS AUTO W/O SCOPE: CPT

## 2021-02-13 PROCEDURE — 85652 RBC SED RATE AUTOMATED: CPT

## 2021-02-13 PROCEDURE — 86038 ANTINUCLEAR ANTIBODIES: CPT

## 2021-02-13 PROCEDURE — 80053 COMPREHEN METABOLIC PANEL: CPT

## 2021-02-13 PROCEDURE — 82043 UR ALBUMIN QUANTITATIVE: CPT

## 2021-02-15 LAB — NUCLEAR IGG SER QL IA: NORMAL

## 2021-02-16 LAB — CCP IGG SERPL-ACNC: 4 UNITS (ref 0–19)

## 2021-02-25 ENCOUNTER — HOSPITAL ENCOUNTER (EMERGENCY)
Facility: MEDICAL CENTER | Age: 61
End: 2021-02-25
Attending: EMERGENCY MEDICINE
Payer: COMMERCIAL

## 2021-02-25 VITALS
DIASTOLIC BLOOD PRESSURE: 66 MMHG | WEIGHT: 153.88 LBS | HEIGHT: 61 IN | SYSTOLIC BLOOD PRESSURE: 129 MMHG | HEART RATE: 64 BPM | BODY MASS INDEX: 29.05 KG/M2 | RESPIRATION RATE: 16 BRPM | OXYGEN SATURATION: 98 % | TEMPERATURE: 98 F

## 2021-02-25 DIAGNOSIS — S61.411A LACERATION OF RIGHT HAND WITHOUT FOREIGN BODY, INITIAL ENCOUNTER: ICD-10-CM

## 2021-02-25 PROCEDURE — 700101 HCHG RX REV CODE 250: Performed by: EMERGENCY MEDICINE

## 2021-02-25 PROCEDURE — A6403 STERILE GAUZE>16 <= 48 SQ IN: HCPCS | Mod: EDC

## 2021-02-25 PROCEDURE — 304217 HCHG IRRIGATION SYSTEM: Mod: EDC

## 2021-02-25 PROCEDURE — 304999 HCHG REPAIR-SIMPLE/INTERMED LEVEL 1: Mod: EDC

## 2021-02-25 PROCEDURE — 303747 HCHG EXTRA SUTURE: Mod: EDC

## 2021-02-25 PROCEDURE — 99282 EMERGENCY DEPT VISIT SF MDM: CPT | Mod: EDC

## 2021-02-25 RX ORDER — LIDOCAINE HYDROCHLORIDE AND EPINEPHRINE BITARTRATE 20; .01 MG/ML; MG/ML
10 INJECTION, SOLUTION SUBCUTANEOUS ONCE
Status: COMPLETED | OUTPATIENT
Start: 2021-02-25 | End: 2021-02-25

## 2021-02-25 RX ADMIN — LIDOCAINE HYDROCHLORIDE AND EPINEPHRINE 2 ML: 20; 10 INJECTION, SOLUTION INFILTRATION; PERINEURAL at 13:15

## 2021-02-25 ASSESSMENT — FIBROSIS 4 INDEX: FIB4 SCORE: 1.34

## 2021-02-25 NOTE — ED NOTES
"Cherrie Gordon has been discharged from the Emergency Room.    Discharge instructions, which include signs and symptoms to monitor at home for, as well as detailed information regarding laceration of right hand provided.  All questions and concerns addressed at this time.      Patient aware to monitor for signs of infection.  She verbalizes understanding that sutures are to be assessed and possibly removed in 10 days.  Dressing supplies provided.     Patient leaves ER in no apparent distress. This RN provided education regarding returning to the ER for any new concerns or changes in patient's condition.      /66   Pulse 64   Temp 36.7 °C (98 °F) (Temporal)   Resp 16   Ht 1.549 m (5' 1\")   Wt 69.8 kg (153 lb 14.1 oz)   SpO2 98%   BMI 29.08 kg/m²   "

## 2021-02-25 NOTE — ED NOTES
First interaction with patient.  Assumed care at this time.  Patient presents to the ER today for complaint of laceration to the palm of her right hand.  She states that she cut her hand on a metal broomstick.  She is not up to date on her tetanus.   Wound is dressed and bleeding is controlled.    Gown provided, patient instructed to changed prior to ERP evaluation.  NPO status explained by this RN.  Call light provided.  Chart up for ERP.    This RN provided education to patient about the importance of keeping mask in place over both mouth and nose for entire duration of ER visit.

## 2021-02-25 NOTE — ED PROVIDER NOTES
"ED Provider Note    CHIEF COMPLAINT  Chief Complaint   Patient presents with    Laceration     right hand laceration, no bleeding noted.       HPI  Cherrie Gordon is a 60 y.o. female who presents for evaluation of a laceration to right palm, she was using a broom that had a metal handle that was apparently broken and sliced her hand.  No weakness numbness or tingling.  She is had no fever.  This happened shortly prior to arrival.  She is a history of osteoporosis and \"prediabetes\" but no blood thinners or other medications.    REVIEW OF SYSTEMS  Negative for fever, weakness, numbness    PAST MEDICAL HISTORY   has a past medical history of OSTEOPOROSIS and Ulcer.    SOCIAL HISTORY  Social History     Tobacco Use    Smoking status: Former Smoker     Packs/day: 0.25     Years: 2.00     Pack years: 0.50     Types: Cigarettes    Smokeless tobacco: Never Used    Tobacco comment: started smoking at age 32 1 pack per month   Substance and Sexual Activity    Alcohol use: No    Drug use: No    Sexual activity: Yes     Partners: Male       SURGICAL HISTORY   has a past surgical history that includes tubal ligation.    CURRENT MEDICATIONS  I personally reviewed the medication list in the charting documentation.     ALLERGIES  Allergies   Allergen Reactions    Pcn [Penicillins]        PHYSICAL EXAM  VITAL SIGNS: /72   Pulse 71   Temp 36.3 °C (97.3 °F) (Temporal)   Resp 16   Ht 1.549 m (5' 1\")   Wt 69.8 kg (153 lb 14.1 oz)   SpO2 94%   BMI 29.08 kg/m²   Constitutional: Alert in no apparent distress.  HENT: No signs of trauma.   Eyes: Conjunctiva normal, Non-icteric.   Chest: Normal nonlabored respirations  Skin: No erythema, No rash.   Musculoskeletal: Inspection of the right palm reveals a horizontally oriented subcutaneous laceration measuring approximately 3 cm, linear with subcutaneous fat exposed.  No involvement of the tendons.  No active bleeding.  Distally neurovascularly intact with normal " capillary refill and sensation across all digits.  Full range of motion of the fingers.  Neurologic: Alert, No focal deficits noted.   Psychiatric: Affect normal, Judgment normal.    DIAGNOSTIC STUDIES / PROCEDURES    Laceration Repair Procedure Note    Indication: Laceration    Procedure: The patient was placed in the appropriate position and anesthesia around the laceration was obtained by infiltration using 1% Lidocaine with epinephrine. The area was then irrigated with normal saline, explored with no foreign bodies discovered and no tendon injury noted, and draped in a sterile fashion. The laceration was closed with 5-0 Ethilon using interrupted sutures with the assistance of a medical student. There were no additional lacerations requiring repair.    Total repaired wound length: 3 cm.     Other Items: Suture count: 5    The patient tolerated the procedure well.    Complications: None        COURSE & MEDICAL DECISION MAKING  Pertinent Labs & Imaging studies reviewed. (See chart for details)    Encounter Summary: This is a 60 y.o. female with an otherwise uncomplicated laceration across the palm of the right hand.  Anesthetized, cleansed and repaired here in the emergency department with the assistance of a medical student.  No structural involvement, no neurovascular involvement.  Tetanus has been updated here.  Discharged home in stable condition with strict return instructions discussed.      DISPOSITION: Discharge Home      FINAL IMPRESSION  1. Laceration of right hand without foreign body, initial encounter        This dictation was created using voice recognition software. The accuracy of the dictation is limited to the abilities of the software. I expect there may be some errors of grammar and possibly content. The nursing notes were reviewed and certain aspects of this information were incorporated into this note.    Electronically signed by: Martell Membreno M.D., 2/25/2021 12:55 PM

## 2021-02-25 NOTE — ED TRIAGE NOTES
Cherrie Gordon  60 y.o. female  Chief Complaint   Patient presents with   • Laceration     right hand laceration, no bleeding noted.       Pt ambulatory to triage with steady gait for above complaint.   Pt is alert and oriented, speaking in full sentences, follows commands and responds appropriately to questions. Resp are even and unlabored.     Pt placed in lobby. Pt educated on triage process. Pt encouraged to alert staff for any changes and verbalized understanding. This RN masked and in appropriate PPE during encounter.

## 2021-03-15 DIAGNOSIS — Z23 NEED FOR VACCINATION: ICD-10-CM

## 2021-04-09 ENCOUNTER — HOSPITAL ENCOUNTER (OUTPATIENT)
Dept: RADIOLOGY | Facility: MEDICAL CENTER | Age: 61
End: 2021-04-09
Attending: STUDENT IN AN ORGANIZED HEALTH CARE EDUCATION/TRAINING PROGRAM
Payer: COMMERCIAL

## 2021-04-09 DIAGNOSIS — Z12.31 VISIT FOR SCREENING MAMMOGRAM: ICD-10-CM

## 2021-04-09 PROCEDURE — 77063 BREAST TOMOSYNTHESIS BI: CPT

## 2021-10-25 ENCOUNTER — APPOINTMENT (OUTPATIENT)
Dept: MEDICAL GROUP | Facility: CLINIC | Age: 61
End: 2021-10-25
Payer: COMMERCIAL

## 2022-11-05 ENCOUNTER — HOSPITAL ENCOUNTER (OUTPATIENT)
Dept: LAB | Facility: MEDICAL CENTER | Age: 62
End: 2022-11-05
Attending: NURSE PRACTITIONER
Payer: COMMERCIAL

## 2022-11-05 LAB
ALBUMIN SERPL BCP-MCNC: 4.5 G/DL (ref 3.2–4.9)
ALBUMIN/GLOB SERPL: 1.6 G/DL
ALP SERPL-CCNC: 80 U/L (ref 30–99)
ALT SERPL-CCNC: 23 U/L (ref 2–50)
ANION GAP SERPL CALC-SCNC: 11 MMOL/L (ref 7–16)
AST SERPL-CCNC: 21 U/L (ref 12–45)
BASOPHILS # BLD AUTO: 0.9 % (ref 0–1.8)
BASOPHILS # BLD: 0.05 K/UL (ref 0–0.12)
BILIRUB SERPL-MCNC: 0.3 MG/DL (ref 0.1–1.5)
BUN SERPL-MCNC: 12 MG/DL (ref 8–22)
CALCIUM SERPL-MCNC: 9.5 MG/DL (ref 8.5–10.5)
CHLORIDE SERPL-SCNC: 107 MMOL/L (ref 96–112)
CHOLEST SERPL-MCNC: 228 MG/DL (ref 100–199)
CO2 SERPL-SCNC: 23 MMOL/L (ref 20–33)
CREAT SERPL-MCNC: 0.68 MG/DL (ref 0.5–1.4)
EOSINOPHIL # BLD AUTO: 0.18 K/UL (ref 0–0.51)
EOSINOPHIL NFR BLD: 3.1 % (ref 0–6.9)
ERYTHROCYTE [DISTWIDTH] IN BLOOD BY AUTOMATED COUNT: 40.8 FL (ref 35.9–50)
EST. AVERAGE GLUCOSE BLD GHB EST-MCNC: 117 MG/DL
GFR SERPLBLD CREATININE-BSD FMLA CKD-EPI: 99 ML/MIN/1.73 M 2
GLOBULIN SER CALC-MCNC: 2.9 G/DL (ref 1.9–3.5)
GLUCOSE SERPL-MCNC: 97 MG/DL (ref 65–99)
HBA1C MFR BLD: 5.7 % (ref 4–5.6)
HCT VFR BLD AUTO: 44 % (ref 37–47)
HDLC SERPL-MCNC: 56 MG/DL
HGB BLD-MCNC: 14.6 G/DL (ref 12–16)
IMM GRANULOCYTES # BLD AUTO: 0.02 K/UL (ref 0–0.11)
IMM GRANULOCYTES NFR BLD AUTO: 0.3 % (ref 0–0.9)
LDLC SERPL CALC-MCNC: 141 MG/DL
LYMPHOCYTES # BLD AUTO: 2.1 K/UL (ref 1–4.8)
LYMPHOCYTES NFR BLD: 35.8 % (ref 22–41)
MCH RBC QN AUTO: 27.5 PG (ref 27–33)
MCHC RBC AUTO-ENTMCNC: 33.2 G/DL (ref 33.6–35)
MCV RBC AUTO: 83 FL (ref 81.4–97.8)
MONOCYTES # BLD AUTO: 0.41 K/UL (ref 0–0.85)
MONOCYTES NFR BLD AUTO: 7 % (ref 0–13.4)
NEUTROPHILS # BLD AUTO: 3.11 K/UL (ref 2–7.15)
NEUTROPHILS NFR BLD: 52.9 % (ref 44–72)
NRBC # BLD AUTO: 0 K/UL
NRBC BLD-RTO: 0 /100 WBC
PLATELET # BLD AUTO: 239 K/UL (ref 164–446)
PMV BLD AUTO: 12 FL (ref 9–12.9)
POTASSIUM SERPL-SCNC: 4.2 MMOL/L (ref 3.6–5.5)
PROT SERPL-MCNC: 7.4 G/DL (ref 6–8.2)
RBC # BLD AUTO: 5.3 M/UL (ref 4.2–5.4)
SODIUM SERPL-SCNC: 141 MMOL/L (ref 135–145)
TRIGL SERPL-MCNC: 157 MG/DL (ref 0–149)
TSH SERPL DL<=0.005 MIU/L-ACNC: 2.77 UIU/ML (ref 0.38–5.33)
WBC # BLD AUTO: 5.9 K/UL (ref 4.8–10.8)

## 2022-11-05 PROCEDURE — 80053 COMPREHEN METABOLIC PANEL: CPT

## 2022-11-05 PROCEDURE — 36415 COLL VENOUS BLD VENIPUNCTURE: CPT

## 2022-11-05 PROCEDURE — 85025 COMPLETE CBC W/AUTO DIFF WBC: CPT

## 2022-11-05 PROCEDURE — 80061 LIPID PANEL: CPT

## 2022-11-05 PROCEDURE — 83036 HEMOGLOBIN GLYCOSYLATED A1C: CPT

## 2022-11-05 PROCEDURE — 84443 ASSAY THYROID STIM HORMONE: CPT

## 2024-02-09 ENCOUNTER — HOSPITAL ENCOUNTER (OUTPATIENT)
Dept: RADIOLOGY | Facility: MEDICAL CENTER | Age: 64
End: 2024-02-09
Attending: NURSE PRACTITIONER
Payer: COMMERCIAL

## 2024-02-09 DIAGNOSIS — S49.92XA INJURY OF LEFT UPPER ARM, INITIAL ENCOUNTER: ICD-10-CM

## 2024-02-09 PROCEDURE — 73060 X-RAY EXAM OF HUMERUS: CPT | Mod: LT

## 2024-07-22 ENCOUNTER — HOSPITAL ENCOUNTER (OUTPATIENT)
Dept: RADIOLOGY | Facility: MEDICAL CENTER | Age: 64
End: 2024-07-22
Attending: INTERNAL MEDICINE
Payer: COMMERCIAL

## 2024-07-22 DIAGNOSIS — R05.9 COUGH, UNSPECIFIED TYPE: ICD-10-CM

## 2024-07-22 DIAGNOSIS — Z77.29: ICD-10-CM

## 2024-07-22 DIAGNOSIS — R09.89 HYPERINFLATION OF LUNGS: ICD-10-CM

## 2024-07-22 PROCEDURE — 71250 CT THORAX DX C-: CPT

## 2025-03-06 NOTE — Clinical Note
REFERRAL APPROVAL NOTICE         Sent on March 6, 2025                   Cherrie Graffopalnarciso  [address] [address]                   Dear Ms. Edwin Gordon,    After a careful review of the medical information and benefit coverage, [facility] has processed your referral. See below for additional details.    If applicable, you must be actively enrolled with your insurance for coverage of the authorized service. If you have any questions regarding your coverage, please contact your insurance directly.    REFERRAL INFORMATION   Referral #:  [id number]  Referred-To Department    Referred-By Provider:  Pulmonary and Sleep Medicine    HAI Zendejas   [place]      [address]  [address]  [place] [id number] [phone] [address], [address]  [place] [id number] [phone]    Referral Start Date:  03/04/2025  Referral End Date:   03/04/2026           SCHEDULING  If you do not already have an appointment, please call [phone] to make an appointment.   MORE INFORMATION  As a reminder, [facility] ownership has changed, meaning this location is now owned and operated by [facility]. As such, we want to clarify that our patients should expect to receive two separate bills for the services received at [facility] - one representing the [facility] facility fees as the owner of the establishment, and the other to represent the physician's services and subsequent fees. You can speak with your insurance carrier for a pricing estimate by calling the customer service number on the back of your card and ask about charges for a hospital outpatient visit.  If you do not already have a [employer] account, sign up at: [email or web address]  You can access your medical information, make appointments, see  lab results, billing information, and more.  If you have questions regarding this referral, please contact  the [facility] department at:             [phone]. Monday - Friday 7:30AM - 5:00PM.      Sincerely,  [facility]

## 2025-07-11 ENCOUNTER — APPOINTMENT (OUTPATIENT)
Dept: RADIOLOGY | Facility: MEDICAL CENTER | Age: 65
End: 2025-07-11
Attending: INTERNAL MEDICINE
Payer: COMMERCIAL

## 2025-08-17 ENCOUNTER — HOSPITAL ENCOUNTER (OUTPATIENT)
Dept: RADIOLOGY | Facility: MEDICAL CENTER | Age: 65
End: 2025-08-17
Attending: INTERNAL MEDICINE
Payer: COMMERCIAL

## 2025-08-17 DIAGNOSIS — Z77.29: ICD-10-CM

## 2025-08-17 DIAGNOSIS — R91.1 PULMONARY NODULE: ICD-10-CM

## 2025-08-17 DIAGNOSIS — R05.9 COMPLAINING OF COUGH: ICD-10-CM

## 2025-08-17 DIAGNOSIS — R09.89 ABNORMAL CHEST SOUNDS: ICD-10-CM

## 2025-08-17 PROCEDURE — 71250 CT THORAX DX C-: CPT
